# Patient Record
Sex: MALE | URBAN - METROPOLITAN AREA
[De-identification: names, ages, dates, MRNs, and addresses within clinical notes are randomized per-mention and may not be internally consistent; named-entity substitution may affect disease eponyms.]

---

## 2023-07-26 ENCOUNTER — APPOINTMENT (OUTPATIENT)
Dept: URBAN - METROPOLITAN AREA CLINIC 258 | Age: 43
Setting detail: DERMATOLOGY
End: 2023-07-27

## 2023-07-26 VITALS — WEIGHT: 195 LBS | HEIGHT: 73 IN

## 2023-07-26 DIAGNOSIS — D17 BENIGN LIPOMATOUS NEOPLASM: ICD-10-CM

## 2023-07-26 PROBLEM — D17.22 BENIGN LIPOMATOUS NEOPLASM OF SKIN AND SUBCUTANEOUS TISSUE OF LEFT ARM: Status: ACTIVE | Noted: 2023-07-26

## 2023-07-26 PROBLEM — D17.24 BENIGN LIPOMATOUS NEOPLASM OF SKIN AND SUBCUTANEOUS TISSUE OF LEFT LEG: Status: ACTIVE | Noted: 2023-07-26

## 2023-07-26 PROCEDURE — OTHER ADDITIONAL NOTES: OTHER

## 2023-07-26 PROCEDURE — OTHER MIPS QUALITY: OTHER

## 2023-07-26 PROCEDURE — OTHER COUNSELING: OTHER

## 2023-07-26 PROCEDURE — OTHER DEFER: OTHER

## 2023-07-26 PROCEDURE — 99202 OFFICE O/P NEW SF 15 MIN: CPT

## 2023-07-26 ASSESSMENT — LOCATION ZONE DERM
LOCATION ZONE: ARM
LOCATION ZONE: LEG

## 2023-07-26 ASSESSMENT — LOCATION DETAILED DESCRIPTION DERM
LOCATION DETAILED: LEFT LATERAL PROXIMAL UPPER ARM
LOCATION DETAILED: LEFT POSTERIOR LATERAL DISTAL THIGH
LOCATION DETAILED: LEFT DISTAL LATERAL DORSAL FOREARM

## 2023-07-26 ASSESSMENT — LOCATION SIMPLE DESCRIPTION DERM
LOCATION SIMPLE: LEFT FOREARM
LOCATION SIMPLE: LEFT UPPER ARM
LOCATION SIMPLE: LEFT THIGH

## 2023-07-26 NOTE — PROCEDURE: ADDITIONAL NOTES
Detail Level: Simple
Additional Notes: Patient educated about surgical removal procedure. Patient would like to think about it. Patient given CPT code to estimate expected cost.
Render Risk Assessment In Note?: no

## 2023-07-26 NOTE — PROCEDURE: DEFER
Introduction Text (Please End With A Colon): The following procedure was deferred:
Detail Level: Detailed
X Size Of Lesion In Cm (Optional): 0
Procedure To Be Performed At Next Visit: Excision by punch method

## 2023-07-26 NOTE — HPI: CYST
Is This A New Presentation, Or A Follow-Up?: Cysts
Additional History: Patient has several cysts. 3 cysts on left arm and 1 cyst on left leg. He reports that they are not painful. He would like to know his options for removal.

## 2023-12-18 ENCOUNTER — APPOINTMENT (OUTPATIENT)
Dept: URBAN - METROPOLITAN AREA CLINIC 258 | Age: 43
Setting detail: DERMATOLOGY
End: 2023-12-19

## 2023-12-18 VITALS — WEIGHT: 200 LBS | HEIGHT: 73 IN

## 2023-12-18 DIAGNOSIS — D17 BENIGN LIPOMATOUS NEOPLASM: ICD-10-CM

## 2023-12-18 PROBLEM — D17.22 BENIGN LIPOMATOUS NEOPLASM OF SKIN AND SUBCUTANEOUS TISSUE OF LEFT ARM: Status: ACTIVE | Noted: 2023-12-18

## 2023-12-18 PROCEDURE — OTHER PUNCH EXCISION: OTHER

## 2023-12-18 PROCEDURE — OTHER COUNSELING: OTHER

## 2023-12-18 PROCEDURE — 11400 EXC TR-EXT B9+MARG 0.5 CM<: CPT

## 2023-12-18 PROCEDURE — 11400 EXC TR-EXT B9+MARG 0.5 CM<: CPT | Mod: 76

## 2023-12-18 PROCEDURE — A4550 SURGICAL TRAYS: HCPCS

## 2023-12-18 PROCEDURE — OTHER MIPS QUALITY: OTHER

## 2023-12-18 ASSESSMENT — LOCATION SIMPLE DESCRIPTION DERM
LOCATION SIMPLE: LEFT FOREARM
LOCATION SIMPLE: LEFT UPPER ARM

## 2023-12-18 ASSESSMENT — LOCATION ZONE DERM: LOCATION ZONE: ARM

## 2023-12-18 ASSESSMENT — LOCATION DETAILED DESCRIPTION DERM
LOCATION DETAILED: LEFT PROXIMAL RADIAL DORSAL FOREARM
LOCATION DETAILED: LEFT DISTAL POSTERIOR UPPER ARM

## 2023-12-18 NOTE — PROCEDURE: PUNCH EXCISION
Size Of Lesion In Cm: 0
Excision Method: 6 mm Punch
Repair Type: None (Simple)
Complex Requirements: Extensive Undermining Performed?: No
Undermining Type: Entire Wound
Debridement Text: The wound edges were debrided prior to proceeding with the closure to facilitate wound healing.
Helical Rim Text: The closure involved the helical rim.
Vermilion Border Text: The closure involved the vermilion border.
Nostril Rim Text: The closure involved the nostril rim.
Retention Suture Text: Retention sutures were placed to support the closure and prevent dehiscence.
Suture Removal: 10 days
Lab: [id number]
Detail Level: Detailed
Excision Depth: adipose tissue
Medical Necessity Clause: The excision was medically necessary because the lesion which was excised was
Anesthesia Type: 1% Xylocaine with 1:200,000 epinephrine and sodium bicarbonate
Additional Anesthesia Volume In Cc: 6
Hemostasis: Electrocautery
Estimated Blood Loss (Cc): minimal
Anesthesia Type: 1% lidocaine with epinephrine
Deep Sutures: 5-0 Vicryl
Epidermal Sutures: 4-0 Nylon
Number Of Epidermal Sutures (Optional): 3
Epidermal Closure: simple interrupted
Wound Care: Petrolatum
Dressing: dry sterile dressing
Complex Repair Preamble Text (Leave Blank If You Do Not Want): Extensive wide undermining was performed.
Intermediate Repair Preamble Text (Leave Blank If You Do Not Want): Undermining was performed with blunt dissection.
1.5 Mm Punch Excision Text: A 1.5 mm punch was used to make an initial incision over the lesion.  After this overlying column of skin was removed, blunt dissection was used to free the lesion from the surrounding tissues and the lesion was extirpated through the surgical opening made by the punch biopsy.
2 Mm Punch Excision Text: A 2 mm punch was used to make an initial incision over the lesion.  After this overlying column of skin was removed, blunt dissection was used to free the lesion from the surrounding tissues and the lesion was extirpated through the surgical opening made by the punch biopsy.
2.5 Mm Punch Excision Text: A 2.5 mm punch was used to make an initial incision over the lesion.  After this overlying column of skin was removed, blunt dissection was used to free the lesion from the surrounding tissues and the lesion was extirpated through the surgical opening made by the punch biopsy.
3 Mm Punch Excision Text: A 3 mm punch was used to make an initial incision over the lesion.  After this overlying column of skin was removed, blunt dissection was used to free the lesion from the surrounding tissues and the lesion was extirpated through the surgical opening made by the punch biopsy.
3.5 Mm Punch Excision Text: A 3.5 mm punch was used to make an initial incision over the lesion.  After this overlying column of skin was removed, blunt dissection was used to free the lesion from the surrounding tissues and the lesion was extirpated through the surgical opening made by the punch biopsy.
4 Mm Punch Excision Text: A 4 mm punch was used to make an initial incision over the lesion.  After this overlying column of skin was removed, blunt dissection was used to free the lesion from the surrounding tissues and the lesion was extirpated through the surgical opening made by the punch biopsy.
4.5 Mm Punch Excision Text: A 4.5 mm punch was used to make an initial incision over the lesion.  After this overlying column of skin was removed, blunt dissection was used to free the lesion from the surrounding tissues and the lesion was extirpated through the surgical opening made by the punch biopsy.
5 Mm Punch Excision Text: A 5 mm punch was used to make an initial incision over the lesion.  After this overlying column of skin was removed, blunt dissection was used to free the lesion from the surrounding tissues and the lesion was extirpated through the surgical opening made by the punch biopsy.
6 Mm Punch Excision Text: A 6 mm punch was used to make an initial incision over the lesion.  After this overlying column of skin was removed, blunt dissection was used to free the lesion from the surrounding tissues and the lesion was extirpated through the surgical opening made by the punch biopsy.
7 Mm Punch Excision Text: A 7 mm punch was used to make an initial incision over the lesion.  After this overlying column of skin was removed, blunt dissection was used to free the lesion from the surrounding tissues and the lesion was extirpated through the surgical opening made by the punch biopsy.
8 Mm Punch Excision Text: A 8 mm punch was used to make an initial incision over the lesion.  After this overlying column of skin was removed, blunt dissection was used to free the lesion from the surrounding tissues and the lesion was extirpated through the surgical opening made by the punch biopsy.
10 Mm Punch Excision Text: A 10 mm punch was used to make an initial incision over the lesion.  After this overlying column of skin was removed, blunt dissection was used to free the lesion from the surrounding tissues and the lesion was extirpated through the surgical opening made by the punch biopsy.
12 Mm Punch Excision Text: A 12 mm punch was used to make an initial incision over the lesion.  After this overlying column of skin was removed, blunt dissection was used to free the lesion from the surrounding tissues and the lesion was extirpated through the surgical opening made by the punch biopsy.
Purse String (Intermediate) Text: Given the location of the defect and the characteristics of the surrounding skin a purse string intermediate closure was deemed most appropriate.  Undermining was performed circumferentially around the surgical defect.  A purse string suture was then placed and tightened.
Path Notes (To The Dermatopathologist): Please check margins.
Medical Necessity Clause: This procedure was medically necessary because the lesion that was treated was:
Consent was obtained from the patient. The risks and benefits to therapy were discussed in detail. Specifically, the risks of infection, scarring, bleeding, prolonged wound healing, incomplete removal, allergy to anesthesia, nerve injury and recurrence were addressed. Prior to the procedure, the treatment site was clearly identified and confirmed by the patient. All components of Universal Protocol/PAUSE Rule completed.
Render Post-Care Instructions In Note?: yes
Post-Care Instructions: I reviewed with the patient in detail post-care instructions. Patient is not to engage in any heavy lifting, exercise, or swimming for the next 14 days. Should the patient develop any fevers, chills, bleeding, severe pain patient will contact the office immediately.
Billing Type: Third-Party Bill
Repair Type: None

## 2025-01-09 ENCOUNTER — TELEPHONE (OUTPATIENT)
Dept: ORTHOPEDICS | Facility: CLINIC | Age: 45
End: 2025-01-09

## 2025-01-09 ENCOUNTER — ANCILLARY PROCEDURE (OUTPATIENT)
Dept: GENERAL RADIOLOGY | Facility: CLINIC | Age: 45
End: 2025-01-09
Attending: PHYSICIAN ASSISTANT
Payer: COMMERCIAL

## 2025-01-09 ENCOUNTER — OFFICE VISIT (OUTPATIENT)
Dept: URGENT CARE | Facility: URGENT CARE | Age: 45
End: 2025-01-09
Payer: COMMERCIAL

## 2025-01-09 VITALS
SYSTOLIC BLOOD PRESSURE: 165 MMHG | HEIGHT: 73 IN | TEMPERATURE: 99.2 F | OXYGEN SATURATION: 99 % | DIASTOLIC BLOOD PRESSURE: 77 MMHG | HEART RATE: 81 BPM | RESPIRATION RATE: 18 BRPM | WEIGHT: 195 LBS | BODY MASS INDEX: 25.84 KG/M2

## 2025-01-09 DIAGNOSIS — S99.922A FOOT INJURY, LEFT, INITIAL ENCOUNTER: ICD-10-CM

## 2025-01-09 DIAGNOSIS — S99.912A INJURY OF LEFT ANKLE, INITIAL ENCOUNTER: ICD-10-CM

## 2025-01-09 DIAGNOSIS — S93.325A DISLOCATION OF TARSOMETATARSAL JOINT OF LEFT FOOT, INITIAL ENCOUNTER: ICD-10-CM

## 2025-01-09 DIAGNOSIS — S92.302A CLOSED DISPLACED FRACTURE OF METATARSAL BONE OF LEFT FOOT, UNSPECIFIED METATARSAL, INITIAL ENCOUNTER: Primary | ICD-10-CM

## 2025-01-09 RX ORDER — NAPROXEN 500 MG/1
500 TABLET ORAL 2 TIMES DAILY WITH MEALS
Qty: 14 TABLET | Refills: 0 | Status: SHIPPED | OUTPATIENT
Start: 2025-01-09 | End: 2025-01-16

## 2025-01-09 ASSESSMENT — ENCOUNTER SYMPTOMS
ARTHRALGIAS: 1
JOINT SWELLING: 1

## 2025-01-09 ASSESSMENT — PAIN SCALES - GENERAL: PAINLEVEL_OUTOF10: EXTREME PAIN (8)
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
## 2025-01-09 NOTE — TELEPHONE ENCOUNTER
ATC called patient to schedule with Dr. Saenz for 10am.  Explained our clinic location and advised on parking/navigating the clinic.  Explained appt will initially be made with Polly Power, and then changed to Dr. Saenz once we get his template open for that.  Patient understands and will be here at 10am on 1/10/25.    Livan Saini MS, ATC, LAT, [facility]  Dr. Humberto Saenz

## 2025-01-09 NOTE — PATIENT INSTRUCTIONS
I placed an Emergent referral to Podiatry so you can be seen in one day.  Wear the walking boot at all times  Be non weighting bearing at all times until you see Podiatrist  Use the crutches for non weight bearing  You can start using an ice bath for a few seconds each time to help with the swelling  Take Tylenol and Naproxen for pain.
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
## 2025-01-09 NOTE — TELEPHONE ENCOUNTER
Please see pt's FOOT FRACTURE, SURGICAL EMERGENCY REFERRAL (ON FILE).    Are you able to fit this pt in?    Pt would like to be seen in the [place] area.    Imaging ON FILE.    Please review and CALL pt to discuss/schedule. Thank you.

## 2025-01-10 ENCOUNTER — ANCILLARY PROCEDURE (OUTPATIENT)
Dept: CT IMAGING | Facility: CLINIC | Age: 45
End: 2025-01-10
Attending: PHYSICIAN ASSISTANT
Payer: COMMERCIAL

## 2025-01-10 ENCOUNTER — ANCILLARY PROCEDURE (OUTPATIENT)
Dept: GENERAL RADIOLOGY | Facility: CLINIC | Age: 45
End: 2025-01-10
Attending: PHYSICIAN ASSISTANT
Payer: COMMERCIAL

## 2025-01-10 DIAGNOSIS — S93.325A LISFRANC DISLOCATION, LEFT, INITIAL ENCOUNTER: ICD-10-CM

## 2025-01-10 DIAGNOSIS — S92.302A CLOSED DISPLACED FRACTURE OF METATARSAL BONE OF LEFT FOOT, UNSPECIFIED METATARSAL, INITIAL ENCOUNTER: ICD-10-CM

## 2025-01-10 DIAGNOSIS — Z98.890 S/P FOOT SURGERY, RIGHT: ICD-10-CM

## 2025-01-10 DIAGNOSIS — S93.325A DISLOCATION OF TARSOMETATARSAL JOINT OF LEFT FOOT, INITIAL ENCOUNTER: ICD-10-CM

## 2025-01-10 PROCEDURE — 73630 X-RAY EXAM OF FOOT: CPT | Mod: RT | Performed by: RADIOLOGY

## 2025-01-10 PROCEDURE — 73700 CT LOWER EXTREMITY W/O DYE: CPT | Mod: LT | Performed by: RADIOLOGY

## 2025-01-13 ENCOUNTER — VIRTUAL VISIT (OUTPATIENT)
Dept: SURGERY | Facility: CLINIC | Age: 45
End: 2025-01-13
Payer: COMMERCIAL

## 2025-01-13 ENCOUNTER — ANESTHESIA EVENT (OUTPATIENT)
Dept: SURGERY | Facility: CLINIC | Age: 45
End: 2025-01-13
Payer: COMMERCIAL

## 2025-01-13 VITALS — WEIGHT: 195 LBS | BODY MASS INDEX: 25.84 KG/M2 | HEIGHT: 73 IN

## 2025-01-13 DIAGNOSIS — S92.302A CLOSED DISPLACED FRACTURE OF METATARSAL BONE OF LEFT FOOT, UNSPECIFIED METATARSAL, INITIAL ENCOUNTER: ICD-10-CM

## 2025-01-13 DIAGNOSIS — Z01.818 PRE-OP EVALUATION: Primary | ICD-10-CM

## 2025-01-13 DIAGNOSIS — S93.325A: ICD-10-CM

## 2025-01-13 DIAGNOSIS — S93.325A DISLOCATION OF TARSOMETATARSAL JOINT OF LEFT FOOT, INITIAL ENCOUNTER: ICD-10-CM

## 2025-01-13 DIAGNOSIS — Z98.890 S/P FOOT SURGERY, RIGHT: Primary | ICD-10-CM

## 2025-01-13 PROCEDURE — 98000 SYNCH AUDIO-VIDEO NEW SF 15: CPT | Performed by: NURSE PRACTITIONER

## 2025-01-13 RX ORDER — CEFAZOLIN SODIUM 2 G/50ML
2 SOLUTION INTRAVENOUS SEE ADMIN INSTRUCTIONS
Status: CANCELLED | OUTPATIENT
Start: 2025-01-13

## 2025-01-13 RX ORDER — CEFAZOLIN SODIUM 2 G/50ML
2 SOLUTION INTRAVENOUS
Status: CANCELLED | OUTPATIENT
Start: 2025-01-13

## 2025-01-13 ASSESSMENT — ENCOUNTER SYMPTOMS: SEIZURES: 0

## 2025-01-13 ASSESSMENT — LIFESTYLE VARIABLES: TOBACCO_USE: 0

## 2025-01-13 ASSESSMENT — PAIN SCALES - GENERAL: PAINLEVEL_OUTOF10: MILD PAIN (3)

## 2025-01-13 NOTE — PROGRESS NOTES
James is a 44 year old who is being evaluated via a billable video visit.    How would you like to obtain your AVS? Email:[email or web address]   If the video visit is dropped, the invitation should be resent by: Send to e-mail at: [email or web address]      Objective    Vitals - Patient Reported  Pain Score: Mild Pain (3)  Pain Loc: Foot

## 2025-01-13 NOTE — PATIENT INSTRUCTIONS
Preparing for Your Surgery      Name:  James Retana   MRN:  [id number]   :  1980   Today's Date:  2025       Arriving for surgery:  Surgery date:  1/15/2025  Arrival time:  9:30 AM    Please come to:         [facility] [address]   [address] [place]  [address]     The Green Ramp for patients and visitors is beneath the [facility]. The parking facility entrance is at the intersection of [address] [place] [address] and [address]. Patients and visitors who self-park will receive the reduced hospital parking rate (no ticket validation needed).     [employer] parking, located at the [facility] main entrance on [address] [place] [address], is available Monday - Friday from 7 am to 3:30 pm.     Discounted parking pass options can be purchased from  attendants during business hours.     -Check in at the security desk in the [facility] ([facility])   Lobby. They will direct you to the correct elevators.   -Proceed to the 3rd floor, Adult Surgery Waiting Lounge. [phone]     If you need directions, a wheelchair or escort please stop at the Information Desk in the lobby.  Inform the information person you are here for surgery; a wheelchair and escort to [address] will be provided.   An escort to the Adult Surgery Waiting Lounge will be provided. .    What can I eat or drink?  -  You may eat and drink normally up to 8 hours prior to arrival time. (Until 1:30 AM)  -  You may have clear liquids until 2 hours prior to arrival time. (Until 7:30 AM)    Examples of clear liquids:  Water  Clear broth  Juices (apple, white grape, white cranberry  and cider) without pulp  Noncarbonated, powder based beverages  (lemonade and Brian-Aid)  Sodas (Sprite, 7-Up, ginger ale and seltzer)  Coffee or tea (without milk or cream)  Gatorade    -  No Alcohol or cannabis products for at least 24 hours  before surgery.     Which medicines can I take?    Hold Aspirin for 7 days before surgery.   Hold Multivitamins for 7 days before surgery.  Hold Supplements for 7 days before surgery.    **Hold Ibuprofen (Advil, Motrin) for 1 day before surgery--unless otherwise directed by surgeon.  **Hold Naproxen (Aleve) for 4 days before surgery.      How do I prepare myself?  - Please take 2 showers (one the night prior to surgery and one the morning of surgery) using Scrubcare or Hibiclens soap.    Use this soap only from the neck to your toes. Avoid genital area      Leave the soap on your skin for one minute--then rinse thoroughly.      You may use your own shampoo and conditioner. No other hair products.   - Please remove all jewelry and body piercings.  - No lotions, deodorants or fragrance.  - No makeup or fingernail polish.   - Bring your ID and insurance card.    -If you use a CPAP machine, please bring the CPAP machine, tubing, and mask to hospital.    -If you have a Deep Brain Stimulator, Spinal Cord Stimulator, or any Neuro Stimulator device---you must bring the remote control to the hospital.      ALL PATIENTS GOING HOME THE SAME DAY OF SURGERY ARE REQUIRED TO HAVE A RESPONSIBLE ADULT TO DRIVE AND BE IN ATTENDANCE WITH THEM FOR 24 HOURS FOLLOWING SURGERY.    Covid testing policy as of 12/06/2022  Your surgeon will notify and schedule you for a COVID test if one is needed before surgery--please direct any questions or COVID symptoms to your surgeon      Questions or Concerns:    - For any questions regarding the day of surgery or your hospital stay, please contact the Pre Admission Nursing Office at [phone].       - If you have health changes between today and your surgery, please call your surgeon.       - For questions after surgery, please call your surgeons office.           Current Visitor Guidelines    You may have 2 visitors in the pre op area.    Visiting hours: 8 a.m. to 8:30 p.m.    Patients confirmed  or suspected to have symptoms of COVID 19 or flu:     No visitors allowed for adult patients.   Children (under age 18) can have 1 named visitor.     People who are sick or showing symptoms of COVID 19 or flu:    Are not allowed to visit patients--we can only make exceptions in special situations.       Please follow these guidelines for your visit:          Please maintain social distance          Masking is optional--however at times you may be asked to wear a mask for the safety of yourself and others     Clean your hands with alcohol hand . Do this when you arrive at and leave the building and patient room,    And again after you touch your mask or anything in the room.     Go directly to and from the room you are visiting.     Stay in the patient s room during your visit. Limit going to other places in the hospital as much as possible     Leave bags and jackets at home or in the car.     For everyone s health, please don t come and go during your visit. That includes for smoking   during your visit.
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
## 2025-01-15 ENCOUNTER — ANESTHESIA (OUTPATIENT)
Dept: SURGERY | Facility: CLINIC | Age: 45
End: 2025-01-15
Payer: COMMERCIAL

## 2025-01-15 ENCOUNTER — HOSPITAL ENCOUNTER (OUTPATIENT)
Facility: CLINIC | Age: 45
Discharge: HOME | End: 2025-01-15
Attending: ORTHOPAEDIC SURGERY | Admitting: ORTHOPAEDIC SURGERY
Payer: COMMERCIAL

## 2025-01-15 ENCOUNTER — APPOINTMENT (OUTPATIENT)
Dept: GENERAL RADIOLOGY | Facility: CLINIC | Age: 45
End: 2025-01-15
Attending: ORTHOPAEDIC SURGERY
Payer: COMMERCIAL

## 2025-01-15 VITALS
OXYGEN SATURATION: 97 % | HEIGHT: 73 IN | WEIGHT: 190.7 LBS | HEART RATE: 95 BPM | DIASTOLIC BLOOD PRESSURE: 89 MMHG | RESPIRATION RATE: 19 BRPM | TEMPERATURE: 96.7 F | BODY MASS INDEX: 25.27 KG/M2 | SYSTOLIC BLOOD PRESSURE: 121 MMHG

## 2025-01-15 DIAGNOSIS — S93.325A LISFRANC DISLOCATION, LEFT, INITIAL ENCOUNTER: Primary | ICD-10-CM

## 2025-01-15 DIAGNOSIS — S92.302A CLOSED DISPLACED FRACTURE OF METATARSAL BONE OF LEFT FOOT, UNSPECIFIED METATARSAL, INITIAL ENCOUNTER: ICD-10-CM

## 2025-01-15 DIAGNOSIS — Z98.890 S/P FOOT SURGERY, RIGHT: ICD-10-CM

## 2025-01-15 DIAGNOSIS — S93.325A DISLOCATION OF TARSOMETATARSAL JOINT OF LEFT FOOT, INITIAL ENCOUNTER: ICD-10-CM

## 2025-01-15 LAB
CREAT SERPL-MCNC: 1.3 MG/DL (ref 0.67–1.17)
EGFRCR SERPLBLD CKD-EPI 2021: 69 ML/MIN/1.73M2
GLUCOSE BLDC GLUCOMTR-MCNC: 107 MG/DL (ref 70–99)

## 2025-01-15 PROCEDURE — 250N000011 HC RX IP 250 OP 636: Performed by: ANESTHESIOLOGY

## 2025-01-15 PROCEDURE — 250N000011 HC RX IP 250 OP 636: Performed by: NURSE ANESTHETIST, CERTIFIED REGISTERED

## 2025-01-15 PROCEDURE — 271N000001 HC OR GENERAL SUPPLY NON-STERILE: Performed by: ORTHOPAEDIC SURGERY

## 2025-01-15 PROCEDURE — 272N000001 HC OR GENERAL SUPPLY STERILE: Performed by: ORTHOPAEDIC SURGERY

## 2025-01-15 PROCEDURE — 370N000017 HC ANESTHESIA TECHNICAL FEE, PER MIN: Performed by: ORTHOPAEDIC SURGERY

## 2025-01-15 PROCEDURE — C1713 ANCHOR/SCREW BN/BN,TIS/BN: HCPCS | Performed by: ORTHOPAEDIC SURGERY

## 2025-01-15 PROCEDURE — 82565 ASSAY OF CREATININE: CPT | Performed by: NURSE PRACTITIONER

## 2025-01-15 PROCEDURE — 250N000009 HC RX 250: Mod: JZ | Performed by: ANESTHESIOLOGY

## 2025-01-15 PROCEDURE — 360N000084 HC SURGERY LEVEL 4 W/ FLUORO, PER MIN: Performed by: ORTHOPAEDIC SURGERY

## 2025-01-15 PROCEDURE — 999N000180 XR SURGERY CARM FLUORO LESS THAN 5 MIN

## 2025-01-15 PROCEDURE — 250N000009 HC RX 250: Performed by: ORTHOPAEDIC SURGERY

## 2025-01-15 PROCEDURE — 250N000009 HC RX 250: Performed by: NURSE ANESTHETIST, CERTIFIED REGISTERED

## 2025-01-15 PROCEDURE — 710N000012 HC RECOVERY PHASE 2, PER MINUTE: Performed by: ORTHOPAEDIC SURGERY

## 2025-01-15 PROCEDURE — 82962 GLUCOSE BLOOD TEST: CPT

## 2025-01-15 PROCEDURE — 258N000003 HC RX IP 258 OP 636: Performed by: NURSE ANESTHETIST, CERTIFIED REGISTERED

## 2025-01-15 PROCEDURE — 250N000011 HC RX IP 250 OP 636: Performed by: ORTHOPAEDIC SURGERY

## 2025-01-15 PROCEDURE — 250N000011 HC RX IP 250 OP 636: Mod: JZ | Performed by: ANESTHESIOLOGY

## 2025-01-15 PROCEDURE — 710N000010 HC RECOVERY PHASE 1, LEVEL 2, PER MIN: Performed by: ORTHOPAEDIC SURGERY

## 2025-01-15 PROCEDURE — 999N000141 HC STATISTIC PRE-PROCEDURE NURSING ASSESSMENT: Performed by: ORTHOPAEDIC SURGERY

## 2025-01-15 PROCEDURE — 36415 COLL VENOUS BLD VENIPUNCTURE: CPT | Performed by: NURSE PRACTITIONER

## 2025-01-15 DEVICE — IMPLANTABLE DEVICE: Type: IMPLANTABLE DEVICE | Site: FOOT | Status: FUNCTIONAL

## 2025-01-15 DEVICE — IMP WIRE KIRSCHNER STRK 1.25X150MM 390157: Type: IMPLANTABLE DEVICE | Site: FOOT | Status: FUNCTIONAL

## 2025-01-15 RX ORDER — OXYCODONE HYDROCHLORIDE 5 MG/1
5-10 TABLET ORAL EVERY 4 HOURS PRN
Qty: 20 TABLET | Refills: 0 | Status: SHIPPED | OUTPATIENT
Start: 2025-01-15 | End: 2025-01-15

## 2025-01-15 RX ORDER — DEXMEDETOMIDINE HYDROCHLORIDE 4 UG/ML
INJECTION, SOLUTION INTRAVENOUS
Status: COMPLETED | OUTPATIENT
Start: 2025-01-15 | End: 2025-01-15

## 2025-01-15 RX ORDER — CEFAZOLIN SODIUM/WATER 2 G/20 ML
2 SYRINGE (ML) INTRAVENOUS
Status: COMPLETED | OUTPATIENT
Start: 2025-01-15 | End: 2025-01-15

## 2025-01-15 RX ORDER — METHOCARBAMOL 750 MG/1
750 TABLET, FILM COATED ORAL EVERY 6 HOURS PRN
Status: CANCELLED | OUTPATIENT
Start: 2025-01-15

## 2025-01-15 RX ORDER — NALOXONE HYDROCHLORIDE 0.4 MG/ML
0.2 INJECTION, SOLUTION INTRAMUSCULAR; INTRAVENOUS; SUBCUTANEOUS
Status: DISCONTINUED | OUTPATIENT
Start: 2025-01-15 | End: 2025-01-15 | Stop reason: HOSPADM

## 2025-01-15 RX ORDER — PROPOFOL 10 MG/ML
INJECTION, EMULSION INTRAVENOUS CONTINUOUS PRN
Status: DISCONTINUED | OUTPATIENT
Start: 2025-01-15 | End: 2025-01-15

## 2025-01-15 RX ORDER — ONDANSETRON 2 MG/ML
4 INJECTION INTRAMUSCULAR; INTRAVENOUS EVERY 30 MIN PRN
Status: DISCONTINUED | OUTPATIENT
Start: 2025-01-15 | End: 2025-01-15 | Stop reason: HOSPADM

## 2025-01-15 RX ORDER — FENTANYL CITRATE 50 UG/ML
INJECTION, SOLUTION INTRAMUSCULAR; INTRAVENOUS PRN
Status: DISCONTINUED | OUTPATIENT
Start: 2025-01-15 | End: 2025-01-15

## 2025-01-15 RX ORDER — DEXAMETHASONE SODIUM PHOSPHATE 10 MG/ML
INJECTION, SOLUTION INTRAMUSCULAR; INTRAVENOUS
Status: COMPLETED | OUTPATIENT
Start: 2025-01-15 | End: 2025-01-15

## 2025-01-15 RX ORDER — HYDROMORPHONE HYDROCHLORIDE 1 MG/ML
0.4 INJECTION, SOLUTION INTRAMUSCULAR; INTRAVENOUS; SUBCUTANEOUS EVERY 5 MIN PRN
Status: DISCONTINUED | OUTPATIENT
Start: 2025-01-15 | End: 2025-01-15 | Stop reason: HOSPADM

## 2025-01-15 RX ORDER — ASPIRIN 81 MG/1
81 TABLET ORAL 2 TIMES DAILY
Status: CANCELLED | OUTPATIENT
Start: 2025-01-15

## 2025-01-15 RX ORDER — PROCHLORPERAZINE MALEATE 10 MG
10 TABLET ORAL EVERY 6 HOURS PRN
Status: CANCELLED | OUTPATIENT
Start: 2025-01-15

## 2025-01-15 RX ORDER — ONDANSETRON 2 MG/ML
4 INJECTION INTRAMUSCULAR; INTRAVENOUS EVERY 6 HOURS PRN
Status: CANCELLED | OUTPATIENT
Start: 2025-01-15

## 2025-01-15 RX ORDER — DEXAMETHASONE SODIUM PHOSPHATE 4 MG/ML
INJECTION, SOLUTION INTRA-ARTICULAR; INTRALESIONAL; INTRAMUSCULAR; INTRAVENOUS; SOFT TISSUE PRN
Status: DISCONTINUED | OUTPATIENT
Start: 2025-01-15 | End: 2025-01-15

## 2025-01-15 RX ORDER — NALOXONE HYDROCHLORIDE 0.4 MG/ML
0.4 INJECTION, SOLUTION INTRAMUSCULAR; INTRAVENOUS; SUBCUTANEOUS
Status: DISCONTINUED | OUTPATIENT
Start: 2025-01-15 | End: 2025-01-15 | Stop reason: HOSPADM

## 2025-01-15 RX ORDER — ACETAMINOPHEN 325 MG/1
650 TABLET ORAL EVERY 6 HOURS PRN
COMMUNITY

## 2025-01-15 RX ORDER — ASPIRIN 81 MG/1
81 TABLET ORAL 2 TIMES DAILY
Qty: 60 TABLET | Refills: 0 | Status: SHIPPED | OUTPATIENT
Start: 2025-01-15

## 2025-01-15 RX ORDER — KETOROLAC TROMETHAMINE 30 MG/ML
INJECTION, SOLUTION INTRAMUSCULAR; INTRAVENOUS PRN
Status: DISCONTINUED | OUTPATIENT
Start: 2025-01-15 | End: 2025-01-15

## 2025-01-15 RX ORDER — BUPIVACAINE HYDROCHLORIDE 2.5 MG/ML
INJECTION, SOLUTION EPIDURAL; INFILTRATION; INTRACAUDAL
Status: COMPLETED | OUTPATIENT
Start: 2025-01-15 | End: 2025-01-15

## 2025-01-15 RX ORDER — HYDROMORPHONE HYDROCHLORIDE 1 MG/ML
0.2 INJECTION, SOLUTION INTRAMUSCULAR; INTRAVENOUS; SUBCUTANEOUS EVERY 5 MIN PRN
Status: DISCONTINUED | OUTPATIENT
Start: 2025-01-15 | End: 2025-01-15 | Stop reason: HOSPADM

## 2025-01-15 RX ORDER — ASPIRIN 81 MG/1
81 TABLET ORAL 2 TIMES DAILY
Qty: 60 TABLET | Refills: 0 | Status: SHIPPED | OUTPATIENT
Start: 2025-01-15 | End: 2025-01-15

## 2025-01-15 RX ORDER — KETOROLAC TROMETHAMINE 15 MG/ML
15 INJECTION, SOLUTION INTRAMUSCULAR; INTRAVENOUS EVERY 6 HOURS
Status: CANCELLED | OUTPATIENT
Start: 2025-01-15 | End: 2025-01-16

## 2025-01-15 RX ORDER — KETOROLAC TROMETHAMINE 30 MG/ML
15 INJECTION, SOLUTION INTRAMUSCULAR; INTRAVENOUS
Status: DISCONTINUED | OUTPATIENT
Start: 2025-01-15 | End: 2025-01-15 | Stop reason: HOSPADM

## 2025-01-15 RX ORDER — PROPOFOL 10 MG/ML
INJECTION, EMULSION INTRAVENOUS PRN
Status: DISCONTINUED | OUTPATIENT
Start: 2025-01-15 | End: 2025-01-15

## 2025-01-15 RX ORDER — SODIUM CHLORIDE, SODIUM LACTATE, POTASSIUM CHLORIDE, CALCIUM CHLORIDE 600; 310; 30; 20 MG/100ML; MG/100ML; MG/100ML; MG/100ML
INJECTION, SOLUTION INTRAVENOUS CONTINUOUS
Status: DISCONTINUED | OUTPATIENT
Start: 2025-01-15 | End: 2025-01-15 | Stop reason: HOSPADM

## 2025-01-15 RX ORDER — CEFAZOLIN SODIUM/WATER 2 G/20 ML
2 SYRINGE (ML) INTRAVENOUS SEE ADMIN INSTRUCTIONS
Status: DISCONTINUED | OUTPATIENT
Start: 2025-01-15 | End: 2025-01-15 | Stop reason: HOSPADM

## 2025-01-15 RX ORDER — NALOXONE HYDROCHLORIDE 0.4 MG/ML
0.1 INJECTION, SOLUTION INTRAMUSCULAR; INTRAVENOUS; SUBCUTANEOUS
Status: DISCONTINUED | OUTPATIENT
Start: 2025-01-15 | End: 2025-01-15 | Stop reason: HOSPADM

## 2025-01-15 RX ORDER — OXYCODONE HYDROCHLORIDE 5 MG/1
5-10 TABLET ORAL EVERY 4 HOURS PRN
Qty: 20 TABLET | Refills: 0 | OUTPATIENT
Start: 2025-01-15

## 2025-01-15 RX ORDER — FENTANYL CITRATE 50 UG/ML
25 INJECTION, SOLUTION INTRAMUSCULAR; INTRAVENOUS EVERY 5 MIN PRN
Status: DISCONTINUED | OUTPATIENT
Start: 2025-01-15 | End: 2025-01-15 | Stop reason: HOSPADM

## 2025-01-15 RX ORDER — MAGNESIUM HYDROXIDE 1200 MG/15ML
LIQUID ORAL PRN
Status: DISCONTINUED | OUTPATIENT
Start: 2025-01-15 | End: 2025-01-15 | Stop reason: HOSPADM

## 2025-01-15 RX ORDER — LIDOCAINE HYDROCHLORIDE 20 MG/ML
INJECTION, SOLUTION INFILTRATION; PERINEURAL PRN
Status: DISCONTINUED | OUTPATIENT
Start: 2025-01-15 | End: 2025-01-15

## 2025-01-15 RX ORDER — ONDANSETRON 2 MG/ML
INJECTION INTRAMUSCULAR; INTRAVENOUS PRN
Status: DISCONTINUED | OUTPATIENT
Start: 2025-01-15 | End: 2025-01-15

## 2025-01-15 RX ORDER — ONDANSETRON 4 MG/1
4 TABLET, ORALLY DISINTEGRATING ORAL EVERY 6 HOURS PRN
Status: CANCELLED | OUTPATIENT
Start: 2025-01-15

## 2025-01-15 RX ORDER — FENTANYL CITRATE 50 UG/ML
50 INJECTION, SOLUTION INTRAMUSCULAR; INTRAVENOUS EVERY 5 MIN PRN
Status: DISCONTINUED | OUTPATIENT
Start: 2025-01-15 | End: 2025-01-15 | Stop reason: HOSPADM

## 2025-01-15 RX ORDER — OXYCODONE HYDROCHLORIDE 10 MG/1
10 TABLET ORAL
Status: DISCONTINUED | OUTPATIENT
Start: 2025-01-15 | End: 2025-01-15 | Stop reason: HOSPADM

## 2025-01-15 RX ORDER — FENTANYL CITRATE 50 UG/ML
25-50 INJECTION, SOLUTION INTRAMUSCULAR; INTRAVENOUS
Status: DISCONTINUED | OUTPATIENT
Start: 2025-01-15 | End: 2025-01-15 | Stop reason: HOSPADM

## 2025-01-15 RX ORDER — FLUMAZENIL 0.1 MG/ML
0.2 INJECTION, SOLUTION INTRAVENOUS
Status: DISCONTINUED | OUTPATIENT
Start: 2025-01-15 | End: 2025-01-15 | Stop reason: HOSPADM

## 2025-01-15 RX ORDER — EPHEDRINE SULFATE 50 MG/ML
INJECTION, SOLUTION INTRAMUSCULAR; INTRAVENOUS; SUBCUTANEOUS PRN
Status: DISCONTINUED | OUTPATIENT
Start: 2025-01-15 | End: 2025-01-15

## 2025-01-15 RX ORDER — DEXAMETHASONE SODIUM PHOSPHATE 4 MG/ML
4 INJECTION, SOLUTION INTRA-ARTICULAR; INTRALESIONAL; INTRAMUSCULAR; INTRAVENOUS; SOFT TISSUE
Status: DISCONTINUED | OUTPATIENT
Start: 2025-01-15 | End: 2025-01-15 | Stop reason: HOSPADM

## 2025-01-15 RX ORDER — OXYCODONE HYDROCHLORIDE 5 MG/1
5 TABLET ORAL
Status: DISCONTINUED | OUTPATIENT
Start: 2025-01-15 | End: 2025-01-15 | Stop reason: HOSPADM

## 2025-01-15 RX ORDER — POLYETHYLENE GLYCOL 3350 17 G/17G
17 POWDER, FOR SOLUTION ORAL DAILY
Status: CANCELLED | OUTPATIENT
Start: 2025-01-16

## 2025-01-15 RX ORDER — CEFAZOLIN SODIUM 2 G/100ML
2 INJECTION, SOLUTION INTRAVENOUS EVERY 8 HOURS
Status: CANCELLED | OUTPATIENT
Start: 2025-01-15 | End: 2025-01-16

## 2025-01-15 RX ORDER — ACETAMINOPHEN 325 MG/1
975 TABLET ORAL EVERY 8 HOURS
Status: CANCELLED | OUTPATIENT
Start: 2025-01-15

## 2025-01-15 RX ORDER — ONDANSETRON 4 MG/1
4 TABLET, ORALLY DISINTEGRATING ORAL EVERY 30 MIN PRN
Status: DISCONTINUED | OUTPATIENT
Start: 2025-01-15 | End: 2025-01-15 | Stop reason: HOSPADM

## 2025-01-15 RX ORDER — OXYCODONE HYDROCHLORIDE 5 MG/1
5 TABLET ORAL EVERY 4 HOURS PRN
Status: CANCELLED | OUTPATIENT
Start: 2025-01-15

## 2025-01-15 RX ORDER — OXYCODONE HYDROCHLORIDE 5 MG/1
5-10 TABLET ORAL EVERY 4 HOURS PRN
Qty: 20 TABLET | Refills: 0 | Status: SHIPPED | OUTPATIENT
Start: 2025-01-15

## 2025-01-15 RX ORDER — SODIUM CHLORIDE, SODIUM LACTATE, POTASSIUM CHLORIDE, CALCIUM CHLORIDE 600; 310; 30; 20 MG/100ML; MG/100ML; MG/100ML; MG/100ML
INJECTION, SOLUTION INTRAVENOUS CONTINUOUS PRN
Status: DISCONTINUED | OUTPATIENT
Start: 2025-01-15 | End: 2025-01-15

## 2025-01-15 RX ORDER — SODIUM CHLORIDE, SODIUM LACTATE, POTASSIUM CHLORIDE, CALCIUM CHLORIDE 600; 310; 30; 20 MG/100ML; MG/100ML; MG/100ML; MG/100ML
INJECTION, SOLUTION INTRAVENOUS CONTINUOUS
Status: CANCELLED | OUTPATIENT
Start: 2025-01-15

## 2025-01-15 RX ORDER — HYDROMORPHONE HYDROCHLORIDE 1 MG/ML
0.2 INJECTION, SOLUTION INTRAMUSCULAR; INTRAVENOUS; SUBCUTANEOUS
Status: CANCELLED | OUTPATIENT
Start: 2025-01-15

## 2025-01-15 RX ORDER — LIDOCAINE 40 MG/G
CREAM TOPICAL
Status: CANCELLED | OUTPATIENT
Start: 2025-01-15

## 2025-01-15 RX ORDER — HYDROMORPHONE HYDROCHLORIDE 4 MG/ML
INJECTION, SOLUTION INTRAMUSCULAR; INTRAVENOUS; SUBCUTANEOUS PRN
Status: DISCONTINUED | OUTPATIENT
Start: 2025-01-15 | End: 2025-01-15

## 2025-01-15 RX ORDER — BISACODYL 10 MG
10 SUPPOSITORY, RECTAL RECTAL DAILY PRN
Status: CANCELLED | OUTPATIENT
Start: 2025-01-18

## 2025-01-15 RX ORDER — AMOXICILLIN 250 MG
1 CAPSULE ORAL 2 TIMES DAILY
Status: CANCELLED | OUTPATIENT
Start: 2025-01-15

## 2025-01-15 RX ORDER — HYDROMORPHONE HYDROCHLORIDE 1 MG/ML
0.4 INJECTION, SOLUTION INTRAMUSCULAR; INTRAVENOUS; SUBCUTANEOUS
Status: CANCELLED | OUTPATIENT
Start: 2025-01-15

## 2025-01-15 RX ORDER — HYDROXYZINE HYDROCHLORIDE 25 MG/1
25 TABLET, FILM COATED ORAL EVERY 6 HOURS PRN
Status: CANCELLED | OUTPATIENT
Start: 2025-01-15

## 2025-01-15 RX ORDER — OXYCODONE HYDROCHLORIDE 10 MG/1
10 TABLET ORAL EVERY 4 HOURS PRN
Status: CANCELLED | OUTPATIENT
Start: 2025-01-15

## 2025-01-15 RX ADMIN — FENTANYL CITRATE 50 MCG: 50 INJECTION INTRAMUSCULAR; INTRAVENOUS at 11:30

## 2025-01-15 RX ADMIN — LIDOCAINE HYDROCHLORIDE 80 MG: 20 INJECTION, SOLUTION INFILTRATION; PERINEURAL at 13:14

## 2025-01-15 RX ADMIN — SODIUM CHLORIDE, POTASSIUM CHLORIDE, SODIUM LACTATE AND CALCIUM CHLORIDE: 600; 310; 30; 20 INJECTION, SOLUTION INTRAVENOUS at 13:06

## 2025-01-15 RX ADMIN — DEXAMETHASONE SODIUM PHOSPHATE 1 MG: 10 INJECTION, SOLUTION INTRAMUSCULAR; INTRAVENOUS at 11:55

## 2025-01-15 RX ADMIN — PROPOFOL 100 MCG/KG/MIN: 10 INJECTION, EMULSION INTRAVENOUS at 13:27

## 2025-01-15 RX ADMIN — HYDROMORPHONE HYDROCHLORIDE 0.5 MG: 1 INJECTION, SOLUTION INTRAMUSCULAR; INTRAVENOUS; SUBCUTANEOUS at 17:26

## 2025-01-15 RX ADMIN — PROPOFOL 200 MCG/KG/MIN: 10 INJECTION, EMULSION INTRAVENOUS at 13:15

## 2025-01-15 RX ADMIN — DEXAMETHASONE SODIUM PHOSPHATE 3 MG: 10 INJECTION, SOLUTION INTRAMUSCULAR; INTRAVENOUS at 11:43

## 2025-01-15 RX ADMIN — EPHEDRINE SULFATE 5 MG: 5 INJECTION INTRAVENOUS at 13:33

## 2025-01-15 RX ADMIN — DEXMEDETOMIDINE HYDROCHLORIDE 30 MCG: 100 INJECTION, SOLUTION INTRAVENOUS at 11:43

## 2025-01-15 RX ADMIN — FENTANYL CITRATE 100 MCG: 50 INJECTION INTRAMUSCULAR; INTRAVENOUS at 14:49

## 2025-01-15 RX ADMIN — DEXMEDETOMIDINE HYDROCHLORIDE 10 MCG: 4 INJECTION, SOLUTION INTRAVENOUS at 11:55

## 2025-01-15 RX ADMIN — EPHEDRINE SULFATE 5 MG: 5 INJECTION INTRAVENOUS at 13:41

## 2025-01-15 RX ADMIN — PROPOFOL 200 MG: 10 INJECTION, EMULSION INTRAVENOUS at 13:14

## 2025-01-15 RX ADMIN — FENTANYL CITRATE 100 MCG: 50 INJECTION INTRAMUSCULAR; INTRAVENOUS at 13:27

## 2025-01-15 RX ADMIN — Medication 2 G: at 13:24

## 2025-01-15 RX ADMIN — EPHEDRINE SULFATE 5 MG: 5 INJECTION INTRAVENOUS at 13:37

## 2025-01-15 RX ADMIN — BUPIVACAINE HYDROCHLORIDE 20 ML: 2.5 INJECTION, SOLUTION EPIDURAL; INFILTRATION; INTRACAUDAL; PERINEURAL at 11:43

## 2025-01-15 RX ADMIN — DEXAMETHASONE SODIUM PHOSPHATE 6 MG: 4 INJECTION, SOLUTION INTRAMUSCULAR; INTRAVENOUS at 13:14

## 2025-01-15 RX ADMIN — EPHEDRINE SULFATE 5 MG: 5 INJECTION INTRAVENOUS at 13:43

## 2025-01-15 RX ADMIN — EPHEDRINE SULFATE 5 MG: 5 INJECTION INTRAVENOUS at 13:30

## 2025-01-15 RX ADMIN — MIDAZOLAM 1 MG: 1 INJECTION INTRAMUSCULAR; INTRAVENOUS at 11:30

## 2025-01-15 RX ADMIN — SODIUM CHLORIDE, POTASSIUM CHLORIDE, SODIUM LACTATE AND CALCIUM CHLORIDE: 600; 310; 30; 20 INJECTION, SOLUTION INTRAVENOUS at 14:33

## 2025-01-15 RX ADMIN — BUPIVACAINE HYDROCHLORIDE 10 ML: 2.5 INJECTION, SOLUTION EPIDURAL; INFILTRATION; INTRACAUDAL at 11:55

## 2025-01-15 RX ADMIN — MIDAZOLAM 1 MG: 1 INJECTION INTRAMUSCULAR; INTRAVENOUS at 13:27

## 2025-01-15 RX ADMIN — MIDAZOLAM 1 MG: 1 INJECTION INTRAMUSCULAR; INTRAVENOUS at 13:06

## 2025-01-15 RX ADMIN — ONDANSETRON 4 MG: 2 INJECTION INTRAMUSCULAR; INTRAVENOUS at 15:35

## 2025-01-15 RX ADMIN — KETOROLAC TROMETHAMINE 30 MG: 30 INJECTION, SOLUTION INTRAMUSCULAR at 17:25

## 2025-01-15 ASSESSMENT — ACTIVITIES OF DAILY LIVING (ADL)
ADLS_ACUITY_SCORE: 32
ADLS_ACUITY_SCORE: 34
ADLS_ACUITY_SCORE: 32
ADLS_ACUITY_SCORE: 34
ADLS_ACUITY_SCORE: 32
ADLS_ACUITY_SCORE: 32

## 2025-01-15 NOTE — ANESTHESIA PROCEDURE NOTES
Sciatic Procedure Note    Pre-Procedure   Staff -        Anesthesiologist:  Robles Newton MD       Performed By: anesthesiologist       Location: pre-op       Procedure Start/Stop Times: 1/15/2025 11:43 PM       Pre-Anesthestic Checklist: patient identified, IV checked, site marked, risks and benefits discussed, informed consent, monitors and equipment checked, pre-op evaluation, at physician/surgeon's request and post-op pain management  Timeout:       Correct Patient: Yes        Correct Procedure: Yes        Correct Site: Yes        Correct Position: Yes        Correct Laterality: Yes        Site Marked: Yes  Procedure Documentation  Procedure: Sciatic         Diagnosis: LEFT FOOT FX       Laterality: left       Patient Position: RLD       Skin prep: Chloraprep       Local skin infiltrated with 3 mL of 1% lidocaine.  (popliteal approach).       Needle Type: short bevel       Needle Gauge: 21.        Needle Length (Inches): 4        Ultrasound guided       1. Ultrasound was used to identify targeted nerve, plexus, vascular marker, or fascial plane and place a needle adjacent to it in real-time.       2. Ultrasound was used to visualize the spread of anesthetic in close proximity to the above referenced structure.       3. A permanent image is entered into the patient's record.       4. The visualized anatomic structures appeared normal.    Assessment/Narrative         The placement was negative for: blood aspirated, painful injection and site bleeding       Paresthesias: No.       Bolus given via needle..        Secured via.        Insertion/Infusion Method: Single Shot       Complications: none    Medication(s) Administered   Bupivacaine 0.25% PF (Infiltration) - Infiltration   20 mL - 1/15/2025 11:43:00 AM  Dexamethasone 10 mg/mL PF (Perineural) - Perineural   3 mg - 1/15/2025 11:43:00 AM  Dexmedetomidine 4 mcg/mL (Perineural) - Perineural   30 mcg - 1/15/2025 11:43:00 AM  Medication  "Administration Time: 1/15/2025 11:43 PM      FOR [facility] ([facility]) ONLY:   Pain Team Contact information: please page the Pain Team Via [employer]. Search \"Pain\". During daytime hours, please page the attending first. At night please page the resident first.      "

## 2025-01-15 NOTE — ANESTHESIA PROCEDURE NOTES
Airway       Patient location during procedure: OR       Procedure Start/Stop Times: 1/15/2025 1:16 PM  Staff -        Anesthesiologist:  Gabriel Allen MD       CRNA: Ash Ramos APRN CRNA       Performed By: CRNAIndications and Patient Condition       Indications for airway management: adryan-procedural       Induction type:intravenous       Mask difficulty assessment: 1 - vent by mask    Final Airway Details       Final airway type: supraglottic airway    Supraglottic Airway Details        Type: LMA       Brand: Air-Q       LMA size: 4    Post intubation assessment        Placement verified by: capnometry, equal breath sounds and chest rise        Number of attempts at approach: 1       Number of other approaches attempted: 0       Ease of procedure: easy       Dentition: Intact and Unchanged    Medication(s) Administered   Medication Administration Time: 1/15/2025 1:16 PM

## 2025-01-15 NOTE — ANESTHESIA PROCEDURE NOTES
Saphenous Procedure Note    Pre-Procedure   Staff -        Anesthesiologist:  Robles Newton MD       Performed By: anesthesiologist       Location: pre-op       Procedure Start/Stop Times: 1/15/2025 11:55 PM       Pre-Anesthestic Checklist: patient identified, IV checked, site marked, risks and benefits discussed, informed consent, monitors and equipment checked, pre-op evaluation, at physician/surgeon's request and post-op pain management  Timeout:       Correct Patient: Yes        Correct Procedure: Yes        Correct Site: Yes        Correct Position: Yes        Correct Laterality: Yes        Site Marked: Yes  Procedure Documentation  Procedure: Saphenous         Diagnosis: LEFT FOOT FRACTURE       Laterality: left       Patient Position: supine       Skin prep: Chloraprep       Local skin infiltrated with 2 mL of 1% lidocaine.        Needle Type: short bevel       Needle Gauge: 21.        Needle Length (Inches): 4        Ultrasound guided       1. Ultrasound was used to identify targeted nerve, plexus, vascular marker, or fascial plane and place a needle adjacent to it in real-time.       2. Ultrasound was used to visualize the spread of anesthetic in close proximity to the above referenced structure.       3. A permanent image is entered into the patient's record.       4. The visualized anatomic structures appeared normal.    Assessment/Narrative         The placement was negative for: blood aspirated, painful injection and site bleeding       Paresthesias: No.       Bolus given via needle..        Secured via.        Insertion/Infusion Method: Single Shot       Complications: none    Medication(s) Administered   Bupivacaine 0.25% PF (Infiltration) - Infiltration   10 mL - 1/15/2025 11:55:00 AM  Dexamethasone 10 mg/mL PF (Perineural) - Perineural   1 mg - 1/15/2025 11:55:00 AM  Dexmedetomidine 4 mcg/mL (Perineural) - Perineural   10 mcg - 1/15/2025 11:55:00 AM  Medication Administration Time:  "1/15/2025 11:55 PM      FOR [facility] ([facility]) ONLY:   Pain Team Contact information: please page the Pain Team Via [employer]. Search \"Pain\". During daytime hours, please page the attending first. At night please page the resident first.      "

## 2025-01-15 NOTE — PROGRESS NOTES
Orthopaedic Surgery Attending    I saw and examined this pleasant 44 year old patient preoperatively today in the preop area at the [facility]. The surgical plan for open reduction internal fixation of the patient's left foot Lisfranc injury, including what the surgery involves, the risks, benefits, and alternatives, the postoperative plan, and realistic expectations for outcome, were all discussed in layman's terms. The particular severity of this injury and the expectation for some amount of residual symptoms whether minor or major was discussed. The possibility of additional surgery in the future, for example for hardware removal, was discussed. No guarantees were expressed or implied as to outcome. The planned and possible locations of the surgical incisions was demonstrated. The correct surgical site was marked with patient participation. Exam shows intact skin on the left foot without visible blisters. The skin on the dorsal left foot does passively wrinkle.  All questions were answered.

## 2025-01-15 NOTE — ANESTHESIA PREPROCEDURE EVALUATION
"Anesthesia Pre-Procedure Evaluation    Patient: James Retana   MRN: [id number] : 1980        Procedure : Procedure(s):  Open Reduction Internal Fixation of Left Foot Lisfranc Injury          No past medical history on file.   History reviewed. No pertinent surgical history.   No Known Allergies   Social History     Tobacco Use    Smoking status: Never    Smokeless tobacco: Never   Substance Use Topics    Alcohol use: Yes     Comment: Moderate      Wt Readings from Last 1 Encounters:   01/15/25 86.5 kg (190 lb 11.2 oz)        Anesthesia Evaluation            ROS/MED HX  ENT/Pulmonary:  - neg pulmonary ROS     Neurologic:  - neg neurologic ROS     Cardiovascular:  - neg cardiovascular ROS     METS/Exercise Tolerance:     Hematologic:  - neg hematologic  ROS     Musculoskeletal:  - neg musculoskeletal ROS     GI/Hepatic:  - neg GI/hepatic ROS     Renal/Genitourinary:  - neg Renal ROS     Endo:  - neg endo ROS     Psychiatric/Substance Use:  - neg psychiatric ROS     Infectious Disease:  - neg infectious disease ROS     Malignancy:  - neg malignancy ROS     Other:  - neg other ROS          Physical Exam    Airway  airway exam normal      Mallampati: I       Respiratory Devices and Support         Dental       (+) Modest Abnormalities - crowns, retainers, 1 or 2 missing teeth      Cardiovascular   cardiovascular exam normal          Pulmonary   pulmonary exam normal                OUTSIDE LABS:  CBC: No results found for: \"WBC\", \"HGB\", \"HCT\", \"PLT\"  BMP:   Lab Results   Component Value Date     (H) 01/15/2025     COAGS: No results found for: \"PTT\", \"INR\", \"FIBR\"  POC: No results found for: \"BGM\", \"HCG\", \"HCGS\"  HEPATIC: No results found for: \"ALBUMIN\", \"PROTTOTAL\", \"ALT\", \"AST\", \"GGT\", \"ALKPHOS\", \"BILITOTAL\", \"BILIDIRECT\", \"GARCIA\"  OTHER: No results found for: \"PH\", \"LACT\", \"A1C\", \"ALEA\", \"PHOS\", \"MAG\", \"LIPASE\", \"AMYLASE\", \"TSH\", \"T4\", \"T3\", \"CRP\", \"SED\"    Anesthesia Plan    ASA Status:  1    NPO Status: " " NPO Appropriate    Anesthesia Type: General.     - Airway: LMA   Induction: Intravenous.   Maintenance: TIVA.        Consents    Anesthesia Plan(s) and associated risks, benefits, and realistic alternatives discussed. Questions answered and patient/representative(s) expressed understanding.     - Discussed: Risks, Benefits and Alternatives for BOTH SEDATION and the PROCEDURE were discussed     - Discussed with:  Patient            Postoperative Care    Pain management: Oral pain medications, Multi-modal analgesia, Peripheral nerve block (Single Shot).   PONV prophylaxis: Ondansetron (or other 5HT-3), Dexamethasone or Solumedrol     Comments:               Gabriel Allen MD, MD    I have reviewed the pertinent notes and labs in the chart from the past 30 days and (re)examined the patient.  Any updates or changes from those notes are reflected in this note.                         # Overweight: Estimated body mass index is 25.16 kg/m  as calculated from the following:    Height as of this encounter: 1.854 m (6' 1\").    Weight as of this encounter: 86.5 kg (190 lb 11.2 oz).             "

## 2025-01-16 ENCOUNTER — TELEPHONE (OUTPATIENT)
Dept: ORTHOPEDICS | Facility: CLINIC | Age: 45
End: 2025-01-16
Payer: COMMERCIAL

## 2025-01-16 NOTE — BRIEF OP NOTE
[facility]    Brief Operative Note    Pre-operative diagnosis: Dislocation of tarsometatarsal joint of left foot, initial encounter [S93.325A]  Closed displaced fracture of metatarsal bone of left foot, unspecified metatarsal, initial encounter [S92.302A]  Lisfranc dislocation, left, initial encounter [S93.325A]  Post-operative diagnosis Same as pre-operative diagnosis    Procedure: Open Reduction Internal Fixation of Left Foot Lisfranc Injury, Left - Foot    Surgeon: Surgeons and Role:     * All Saenz MD - Primary     * Pravin Whitaker MD  Anesthesia: General with Block   Estimated Blood Loss: Less than 50 ml    Drains: None  Specimens: * No specimens in log *  Findings:   See op note .  Complications: None.  Implants:   Implant Name Type Inv. Item Serial No.  Lot No. LRB No. Used Action   IMP WIRE OTTO STRK [id number] - [id number]  IMP WIRE OTTO STRK [id number]  [employer]  Left 4 Used as a Supply   IMP WIRE OTTO [id number] - [id number]  IMP WIRE OTTO [id number]  [employer]  Left 3 Used as a Supply   4.0 X 30 MM SCREW FT Metallic Hardware/Barry   ERON N/A Left 1 Implanted   4.0 X 36 MM SCREW FT Metallic Hardware/Barry   ERON N/A Left 1 Implanted   4.0 X 42 MM SCREW FT Metallic Hardware/Barry   ERON N/A Left 1 Implanted   4.0 X 50 MM SCREW FT Metallic Hardware/Barry   ERON N/A Left 1 Implanted   4.0 X 40 MM SCREW FT Metallic Hardware/Barry   ERON N/A Left 1 Implanted   IMP WIRE OTTO [id number] - [id number]  IMP WIRE OTTO [id number]  [employer] N/A Left 2 Implanted     Plan:  - Same day surgery  - NWB operative extremity, crutches  - Tylenol, ibuprofen, oxycodone for pain control  - Keep splint dry  - Follow up in clinic 2 weeks

## 2025-01-16 NOTE — TELEPHONE ENCOUNTER
ATC sent patient MyChart with relevant XR images, requested patient write back if they need anything additional.    Livan Saini MS, ATC, [facility], [facility]  Dr. Humberto Saenz

## 2025-01-16 NOTE — ANESTHESIA CARE TRANSFER NOTE
Patient: James Retana    Procedure: Procedure(s):  Open Reduction Internal Fixation of Left Foot Lisfranc Injury       Diagnosis: Dislocation of tarsometatarsal joint of left foot, initial encounter [S93.325A]  Closed displaced fracture of metatarsal bone of left foot, unspecified metatarsal, initial encounter [S92.302A]  Lisfranc dislocation, left, initial encounter [S93.325A]  Diagnosis Additional Information: No value filed.    Anesthesia Type:   General     Note:    Oropharynx: oropharynx clear of all foreign objects  Level of Consciousness: awake  Oxygen Supplementation: face mask  Level of Supplemental Oxygen (L/min / FiO2): 8  Independent Airway: airway patency satisfactory and stable  Dentition: dentition unchanged  Vital Signs Stable: post-procedure vital signs reviewed and stable  Report to RN Given: handoff report given  Patient transferred to: PACU  Comments: Regular respirations and patent airway. VSS. IV patent and infusing. Pt resting comfortably. Report given to RN    Handoff Report: Identifed the Patient, Identified the Reponsible Provider, Reviewed the pertinent medical history, Discussed the surgical course, Reviewed Intra-OP anesthesia mangement and issues during anesthesia, Set expectations for post-procedure period and Allowed opportunity for questions and acknowledgement of understanding      Vitals:  Vitals Value Taken Time   /83    Temp 36.4    Pulse 83    Resp 14    SpO2 100        Electronically Signed By: ESTEBAN Nuñez CRNA  January 15, 2025  6:19 PM

## 2025-01-16 NOTE — OP NOTE
DATE OF SURGERY:  01/15/2025.     PREOPERATIVE DIAGNOSIS:  Left midfoot fracture-dislocation involving all five tarsometatarsal joints and Lisfranc joint.     POSTOPERATIVE DIAGNOSIS:  Left midfoot fracture-dislocation involving all five tarsometatarsal joints and Lisfranc joint.     PROCEDURE:  Open reduction, internal fixation of left midfoot (all five tarsometatarsal joints and Lisfranc joint).     PRIMARY SURGEON:  All Saenz MD.     ASSISTANT SURGEON:  Pravin Whitaker MD.     ANESTHESIA:  General laryngeal mask airway with left sciatic and saphenous blocks.     COMPLICATIONS:  One minor complication of a broken tip of a guidepin retained in and apparently completely embedded with the lateral cuneiform, about one inch long; discussed with patient postoperatively.  No other apparent complications noted intraoperatively or immediately postoperatively.     IMPLANTS:  One titanium [place] 4.0 mm cannulated screw each to the medial cuneiform and second metatarsal (Lisfranc joint), to the intercuneiform joints, to the first metatarsal and medial cuneiform (first TMT joint), to the second metatarsal and intermediate cuneiform (second TMT joint), and to the third metatarsal and lateral cuneiform (third TMT joint); one 0.062 inch K-wire each to to fourth metatarsal and cuboid (fourth TMT joint), and to fifth metatarsal and cuboid (fifth TMT joint).  All screws were implanted with the use of a 2.5 mm hex head screwdriver.    SIGNIFICANT FINDINGS:  Preoperative injury of homolateral midfoot fracture-dislocation confirmed with lateral translation of all five metatarsal bases relative to their respective cuneiforms / cuboid.  Excellent reduction confirmed intraoperatively with direct visualization and palpation as well as C-arm imaging.  Stable fixation of first three TMT joints and Lisfranc joint with screws, and of fourth and fifth TMT joints with K-wires.  Articular surfaces had visible chondral injury including the  distal surface of the cuboid, distal surface of the lateral cuneiform, and proximal surface of the second metatarsal base.  Dorsal neurovascular bundle visualized and protected the entire procedure, and remained intact throughout the case.    SPECIMENS:  None.     DRAINS:  None.     ESTIMATED BLOOD LOSS:  Approximately 50 mL.    TOURNIQUET TIME:  120 minutes at 250 mmHg.        INDICATIONS:  James Retana is a very pleasant 44-year-old male patient who recently sustained a very severe left foot injury, a closed homolateral Lisfranc injury/midfoot fracture-dislocation with all 5 metatarsals displaced laterally relative to the respective cuneiform/cuboid bones and multiple associated fractures including the second metatarsal base, the distal cuboid articular surface, and other bones.  The guarded prognosis for this very severe injury was discussed.  The recommendation was made to the patient for open reduction and internal fixation of this injury including the rationale why.  The diagnosis and expected prognosis, nature of the recommended procedure, the risks, benefits, and alternatives, the postoperative plan, and realistic expectations for short and long term outcome were all discussed with the patient in layman's terms.  No guarantees were expressed or implied as to outcome.  The expectation that there would likely always be some amount of long-term, even permanent, pain in the injured foot due to the severity of this particular injury, whether minor or major, was included as part of this discussion.  The patient had the opportunity to have all the questions at the time asked and answered appropriately, verbalized understanding of this discussion, and verbalized the wish to proceed with the planned procedure.  Written informed consent was obtained.     DESCRIPTION OF PROCEDURE:             The patient, James Retana, was met in the preoperative area where the correct surgical site was identified with patient  participation and marked by the primary surgeon.  All questions were answered.  The skin on the dorsal aspect of the operative foot was noted to be intact without visible open wounds, suspicious lesions, fracture blisters, or skin necrosis.  It did appear to passively wrinkle.  The regional anesthesia team performed left sciatic and saphenous nerve blocks.  The patient was then brought to the operating room and was safely transferred to the operating table in the supine position with all bony prominences well padded and a safety strap across the waist.  The anesthesia team successfully induced general anesthesia and placed a supraglottic airway.  A soft bump was placed underneath the hip of the operative lower extremity to keep the toes pointing upwards.  Cefazolin was administered IV per protocol.  Venous thromboembolic prophylaxis was performed with a sequential device on the nonoperative lower extremity.  A tourniquet was placed on the thigh of the operative lower extremity.  The operative lower extremity was then prepped and draped free in the usual sterile fashion, with gauze and Ioban covering the toes for the entire case.  Prior to the start of surgery, a multidisciplinary time out was performed per hospital protocol confirming among the other items, the correct patient identity, procedure, body part, and laterality.  All in the room verbalized agreement.     The operative lower extremity was exsanguinated with a Cleve bandage, and the tourniquet was inflated to 250 mmHg.  A dorsal longitudinal incision was made over the foot, centered between the first and second rays in the medial-lateral direction, and over the tarsometatarsal joints in the proximal-distal direction.  The incision was made through skin only with a #15 blade, and was carefully dissected through the soft tissues with tenotomy scissors.  Meticulous hemostasis was achieved.  The fascia over the EHL and EHB tendons was incised, and the EHL and  EHB tendons were gently and bluntly retracted medially, as there was already a traumatic appearing rent in the deep fascia lateral to the EHL and EHB tendons with exposed base of second metatarsal subluxated dorsal and lateral to the second TMT joint.  The underlying dorsal neurovascular bundle was carefully mobilized in the lateral direction by subperiosteally elevating the soft tissues deep to it, staying directly on bone, and exposing the dorsal aspect of the second metatarsal base and intermediate cuneiform all the way from medial to lateral across the entire widths, allowing safe and careful placement of a bent Hohmann retractor directly on bone on the lateral second metatarsal base under direct visualization, to gently and safely retract the neurovascular bundle laterally.  Care was taken to relax this retractor periodically throughout the case, as well as to protect the neurovascular bundle whenever work was being done in its vicinity.     The first and second tarsometatarsal joints were clearly visualized with both the first and second metatarsals displaced laterally from their respective cuneiforms.   Soft tissue debris was removed from the Lisfranc joint.  The first and second TMT joints were inspected; the articular surfaces of the first TMT joint appeared to be intact, and the second TMT joint was notable for chondral debris apparently arising from the base of the second metatarsal.  This was partially debrided to a stable base.  There were known fractures involving the plantar base of the second metatarsal, but the dorsal aspect of the base and shaft appeared intact and very amenable to screw fixation.  The second TMT joint was manually reduced from its lateral and dorsally subluxated position initially, with assistance of an elevator, confirmed to be anatomic by visual inspection and direct palpation, and provisionally fixated with a K-wire to prevent displacement of this joint while the Lisfranc joint  was reduced.  The Lisfranc joint as well as the first TMT joint and 1-2 intercuneiform joint all appeared unstable.  The Lisfranc joint was then reduced with a reduction clamp from the medial aspect of the medial cuneiform to the lateral aspect of the second metatarsal base, while also confirming that the 1-2 intercuneiform joint was reduced.  A small medial incision was carefully made followed by blunt spreading to bone with careful retraction and protection of the soft tissues prior to placement of the medial side of the clamp.  The lateral side of the clamp was placed under direct visualization directly on bone deep to the neurovascular bundle which was protected gently.  Direct visualization, palpation, and C-arm imaging confirmed an excellent reduction of the Lisfranc joint and the 1-2 intercuneiform joint without a gap.  A [place] elevator could not be inserted into the Lisfranc joint, 1-2 intercuneiform joint, or the second TMT joint, and they all appeared excellently reduced with direct visualization and palpation.  The Lisfranc joint was stabilized with a single fully threaded 4.0 mm cannulated screw, inserted in a lag by technique fashion with a 4.0 mm drill for the gliding hole proximally and 2.7 mm drill for the core diameter distally, from medial proximal to lateral distal achieving four-cortical purchase, and inserted through the previously made medial skin incision.  The guidewire for the 4.0 mm screw, clamp, and second TMT K-wire were removed, and the reduction was maintained at the Lisfranc, 1-2 intercuneiform, and second TMT joints.  The 1-2 intercuneiform joint was stabilized with a fully threaded 4.0 mm cannulated screw inserted in lag by technique fashion, through the previously made small medial incision proximal to the Lisfranc screw, and performed while carefully protecting the soft tissues.  The second TMT joint was stabilized with a fully threaded retrograde 4.0 mm screw in lag by technique  fashion through the dorsal incision while carefully protecting the soft tissues, and its reduction was excellently reduced, confirmed by visualization, palpation, and C-arm imaging, and a [place] elevator could not be inserted into the joint.  The first TMT joint was now manually reduced and stabilized with a retrograde percutaneous placed fully threaded 4.0 mm cannulated screw also inserted in a lag by technique fashion; the skin only was incised followed by blunt spreading to bone and gentle retraction and protection of the surrounding soft tissues with blunt retractors.  Reduction was confirmed by visualization, palpation, and C-arm imaging; a [place] elevator could not be inserted into the joint.  All of the guidepins and provisional pins were removed.     Attention was turned to the third TMT joint.  A separate more laterally placed dorsal incision was made over the dorsolateral midfoot between the third and fourth rays centered over the TMT joints with an adequate skin bridge, through skin only with a #15 blade.  Careful dissection was performed through the subcutaneous and deep tissues with tenotomy scissors.  Meticulous hemostasis was achieved.  Care was taken to avoid injury to the extensor tendons which were bluntly retracted.  The subjacent periosteum and joint capsule over the third and fourth TMT joints was incised longitudinally and careful subperiosteal dissection was performed with an elevator.  Both the third and fourth TMT joints were laterally and plantarly subluxated.  They were then reduced with the use of an elevator and direct pressure from lateral to medial, as well as pulling dorsally on them with a right angle clamp until visibly and palpably reduced at the TMT joints without gap or stepoff.  The third TMT joint was then stabilized with a retrograde fully threaded 4.0 mm cannulated screw inserted in lag by technique fashion through a separate incision.  For this screw placement, the skin only  was incised followed by blunt spreading to bone and gentle retraction and protection of the surrounding soft tissues with blunt retractors.  C-arm imaging, palpation, and direct visualization confirmed excellent reduction of the third TMT joint; at this point it was noted that a portion of the guide pin for the 4.0 mm screw for the third TMT joint appeared to still be present in the lateral cuneiform and likely irretrievable.  This was approximately an inch long by measurement of the removed portion of the guide pin and comparing it with an intact one. It did not appear to be in a position to cause any harm as it appeared to be completely embedded within bone on all views, and efforts to retrieve it would likely be unsuccessful and more likely to cause net harm than good.  The reduction of the fourth and fifth TMT joints had been partially completed with reduction of the previously reduced joints; these joint reductions were now fine tuned manually and with gentle pressure with blunt instruments.  Excellent reduction was confirmed by direct visualization, palpation, and C-arm imaging in multiple views.  Finally, the fourth and fifth TMT joints were stabilized with a single percutaneously inserted retrograde 0.062 inch K-wire each, entering the metatarsals and aiming into the cuboid.     Final C-arm images were taken in the AP, oblique, and lateral views of the operative foot to confirm alignment and implant placement were all excellent.  The tourniquet had been deflated earlier intraoperatively at 120 minutes.  There was no evidence for any vascular injury during this case.  Meticulous hemostasis was achieved.  There was an easily palpable dorsalis pedis pulse after deflation of the tourniquet.  The wounds were thoroughly irrigated and then closed in layers with 2-0 Vicryl for the subcutaneous tissues in the lateral incision and 3-0 nylon for the skin for all incisions.  The incisions were carefully cleansed with  sterile saline and dried.  Sterile dressings were then applied.  A well-padded splint was applied.  All surgical counts were reported as correct at the end of the case.  The patient was taken to the recovery room in stable condition.  I was present and scrubbed in for the entire case.  The minor complication of the broken and retained approximately inch-long portion of guide in irretrievably embedded in the lateral cuneiform was discussed with the patient postoperatively in the PACU, is not expected to cause harm, and will likely not require any treatment.        POSTOPERATIVE PLAN:    Discharge home from PACU once criteria met.  Diet:  Clear liquids and advance as tolerated.  Pain management:  Multimodal. Wean from IV to oral medications.  Weightbearing status: Strict nonweightbearing on the operative lower extremity for at least 6 weeks.  Use appropriate assistive gait devices and assistance (nursing/PT) for ambulation.  Activity: May be up ad eduardo for ADLs but encourage flat bedrest with the foot and ankle of the operative lower extremity elevated above the level of the heart and iced as much of the time as possible for the first 2 weeks postop.  Elevate heels off of the bed.   Xrays:  Completed intraoperatively.  Immobilization:  Keep splint clean, dry, and intact.  Splint will be removed at the first clinic follow-up appointment at 2 weeks postop.  Dressings: Dressings will be changed at the first clinic follow-up visit at 2 weeks postop.  DVT prophylaxis:   mg po daily until 6 weeks postoperative.  Follow up:  2 weeks postop in ortho clinic for dressing change, wound check, and possible suture removal if appropriate to do so at that time.  Splint will be converted to a medical boot which may be removed for brief periods tid for hygiene, skin checks, and active ROM exercises.  Continue nonweightbearing on operative LE until at least 6 weeks postop at which time weightbearing radiographs will be performed  of the operative foot.        All Saenz MD  Attending surgeon  Orthopaedic Surgery

## 2025-01-16 NOTE — TELEPHONE ENCOUNTER
Patient Returning Call    Reason for call:  patient wanting to touch base with RN or CT regarding xr imaging to be pushed to him via [employer] or email, he is not able to pull up himself, requesting callback   Email for patient: [email or web address]    Information relayed to patient:  te sent to clinic     Patient has additional questions:  No      Could we send this information to you in [place] or would you prefer to receive a phone call?:   Patient would prefer a phone call   Okay to leave a detailed message?: Yes at Cell number on file:    Telephone Information:   Mobile [phone]

## 2025-01-16 NOTE — ANESTHESIA POSTPROCEDURE EVALUATION
Patient: James Retana    Procedure: Procedure(s):  Open Reduction Internal Fixation of Left Foot Lisfranc Injury       Anesthesia Type:  General    Note:  Disposition: Outpatient   Postop Pain Control: Uneventful            Sign Out: Well controlled pain   PONV: No   Neuro/Psych: Uneventful            Sign Out: Acceptable/Baseline neuro status   Airway/Respiratory: Uneventful            Sign Out: Acceptable/Baseline resp. status   CV/Hemodynamics: Uneventful            Sign Out: Acceptable CV status; No obvious hypovolemia; No obvious fluid overload   Other NRE: NONE   DID A NON-ROUTINE EVENT OCCUR? No           Last vitals:  Vitals Value Taken Time   /69 01/15/25 1830   Temp 35.9  C (96.7  F) 01/15/25 1815   Pulse 87 01/15/25 1837   Resp 16 01/15/25 1837   SpO2 96 % 01/15/25 1836   Vitals shown include unfiled device data.    Electronically Signed By: Christiano Gaming DO  January 15, 2025  6:41 PM

## 2025-01-31 ENCOUNTER — OFFICE VISIT (OUTPATIENT)
Dept: ORTHOPEDICS | Facility: CLINIC | Age: 45
End: 2025-01-31
Payer: COMMERCIAL

## 2025-01-31 DIAGNOSIS — Z98.890 S/P FOOT SURGERY, LEFT: Primary | ICD-10-CM

## 2025-01-31 PROCEDURE — 99024 POSTOP FOLLOW-UP VISIT: CPT | Performed by: PHYSICIAN ASSISTANT

## 2025-01-31 NOTE — LETTER
1/31/2025      James Retana  [address] [address]      Dear Colleague,    Thank you for referring your patient, James Retana, to the [facility] [place]. Please see a copy of my visit note below.    Subjective  WHAT:  Left foot   WHEN: 01/15/2025  HOW:  Status post Open reduction, internal fixation of left midfoot (all five tarsometatarsal joints and Lisfranc joint)     James is two weeks status post ORIF of left mid foot TMT joints and Lisfranc joint.  Overall the patient notes they are doing well.  The patient reports pain is mild to moderate but improving.  James currently ambulates NWB in post-op splint with crutch assist.  James has been compliant with LLE elevation and maintaining a good healthy diet for healing.The patient is not yet attending physical therapy.  No complications reported since surgery.    Patient denies fevers, chills, worsening pain, incisional drainage or worrisome redness. Denies calf pain or tenderness, chest pain or shortness of breath.     Objective  Musculoskeletal:  Left Foot   Skin:Splint removed, skin intact, no rashes or lesions, surgical incisions benign and healing well, sutures removed without complication.  Effusion:present, mild  Swelling:present mild   Atrophy:absent  Deformity:none  Gait: NWB, Crutch assist    Range of motion:limited but WNL at this phase post-op Light touch sensation intact all dermatomes tested.  DP/PT pulses easily palpated, Foot is WWP  Leg demonstrates no edema, periarticular ankle swelling or erythema    Imaging   No images performed today    Assessment  Status post Open reduction, internal fixation of left midfoot (all five tarsometatarsal joints and Lisfranc joint)  completed on 1/15/2025, healing well at this phase post-op, sutures removed today.    Plan  James is doing well overall postoperatively and has a fantastic attitude which will go a long way to support his recovery.  A thorough discussion was  had today with the patient regarding the surgical procedure and continued precautions.  James was cleared to transition from postop splint to long cam boot today. CAM boot currently at home. Extra cushion for percutaneous pins provided.  The patient may remove boot/ACE/Dressings 15-20 minutes 3x/day for exercises, hygiene and skin checks The patient is not yet ready to start physical therapy, but was instructed to start a home exercise program (ABC's, ankle pumps, circles clockwise/counterclockwise and dorsiflexion stretching).  HEP was demonstrated in clinic today. Incision care reviewed, no soaking, showers only. The patient will remain nonweightbearing for an additional 4 weeks until follow-up with Dr. Saenz.  James was advised to take aspirin for DVT prophylaxis for the full 6 weeks prescribed.  Multivitamin and nutritional optimization discussed. James will follow-up in 4 weeks for re-assessment and x-rays.  All other questions were answered today.       Again, thank you for allowing me to participate in the care of your patient.        Sincerely,        Polly Power PA-C    Electronically signed

## 2025-01-31 NOTE — NURSING NOTE
Reason For Visit:   Chief Complaint   Patient presents with    Surgical Followup     2 weeks s/p Left lisfranc DOS 1/15/25       There were no vitals taken for this visit.         Amanda Peterson, ATC

## 2025-02-11 NOTE — PROGRESS NOTES
Subjective  WHAT:  Left foot   WHEN: 01/15/2025  HOW:  Status post Open reduction, internal fixation of left midfoot (all five tarsometatarsal joints and Lisfranc joint)     James is two weeks status post ORIF of left mid foot TMT joints and Lisfranc joint.  Overall the patient notes they are doing well.  The patient reports pain is mild to moderate but improving.  James currently ambulates NWB in post-op splint with crutch assist.  James has been compliant with LLE elevation and maintaining a good healthy diet for healing.The patient is not yet attending physical therapy.  No complications reported since surgery.    Patient denies fevers, chills, worsening pain, incisional drainage or worrisome redness. Denies calf pain or tenderness, chest pain or shortness of breath.     Objective  Musculoskeletal:  Left Foot   Skin:Splint removed, skin intact, no rashes or lesions, surgical incisions benign and healing well, sutures removed without complication.  Effusion:present, mild  Swelling:present mild   Atrophy:absent  Deformity:none  Gait: NWB, Crutch assist    Range of motion:limited but WNL at this phase post-op Light touch sensation intact all dermatomes tested.  DP/PT pulses easily palpated, Foot is WWP  Leg demonstrates no edema, periarticular ankle swelling or erythema    Imaging   No images performed today    Assessment  Status post Open reduction, internal fixation of left midfoot (all five tarsometatarsal joints and Lisfranc joint)  completed on 1/15/2025, healing well at this phase post-op, sutures removed today.    Plan  James is doing well overall postoperatively and has a fantastic attitude which will go a long way to support his recovery.  A thorough discussion was had today with the patient regarding the surgical procedure and continued precautions.  James was cleared to transition from postop splint to long cam boot today. CAM boot currently at home. Extra cushion for percutaneous pins provided.  The patient  may remove boot/ACE/Dressings 15-20 minutes 3x/day for exercises, hygiene and skin checks The patient is not yet ready to start physical therapy, but was instructed to start a home exercise program (ABC's, ankle pumps, circles clockwise/counterclockwise and dorsiflexion stretching).  HEP was demonstrated in clinic today. Incision care reviewed, no soaking, showers only. The patient will remain nonweightbearing for an additional 4 weeks until follow-up with Dr. Saenz.  James was advised to take aspirin for DVT prophylaxis for the full 6 weeks prescribed.  Multivitamin and nutritional optimization discussed. James will follow-up in 4 weeks for re-assessment and x-rays.  All other questions were answered today.

## 2025-02-20 DIAGNOSIS — Z98.890 S/P FOOT SURGERY, LEFT: Primary | ICD-10-CM

## 2025-02-28 ENCOUNTER — ANCILLARY PROCEDURE (OUTPATIENT)
Dept: GENERAL RADIOLOGY | Facility: CLINIC | Age: 45
End: 2025-02-28
Attending: ORTHOPAEDIC SURGERY
Payer: COMMERCIAL

## 2025-02-28 ENCOUNTER — OFFICE VISIT (OUTPATIENT)
Dept: ORTHOPEDICS | Facility: CLINIC | Age: 45
End: 2025-02-28
Payer: COMMERCIAL

## 2025-02-28 DIAGNOSIS — Z98.890 S/P FOOT SURGERY, LEFT: ICD-10-CM

## 2025-02-28 DIAGNOSIS — S93.325D LISFRANC DISLOCATION, LEFT, SUBSEQUENT ENCOUNTER: Primary | ICD-10-CM

## 2025-02-28 PROCEDURE — 99024 POSTOP FOLLOW-UP VISIT: CPT | Performed by: ORTHOPAEDIC SURGERY

## 2025-02-28 PROCEDURE — 73630 X-RAY EXAM OF FOOT: CPT | Mod: LT | Performed by: RADIOLOGY

## 2025-02-28 NOTE — NURSING NOTE
Reason For Visit:   Chief Complaint   Patient presents with    RECHECK     Patient returns 6w 2d s/p ORIF left Lisfranc Fx.  He reports doing well today.  He is anxious to bear weight.  He presents to clinic NWB in tall cam boot with crutches for mobility.       There were no vitals taken for this visit.    Pain Assessment  Patient Currently in Pain: No    Livan Saini, ATC     Urine drug screen  Discussed with patient that I cannot refill this medication if using on a sparingly basis.  If she is needing medication refilled monthly, will refer to pain management

## 2025-02-28 NOTE — LETTER
2/28/2025      James Retana  [address] [address]      Dear Colleague,    Thank you for referring your patient, James Retana, to the [facility] [place]. Please see a copy of my visit note below.    Orthopaedic Surgery Clinic Follow-up Appointment    DOI:  01/09/2025, closed L homolateral Lisfranc fx-dislocation (CT with medial & lateral cuneiform, cuboid, and 2nd met base fractures).  DOS: 01/15/2025, ORIF L midfoot.    History:  I saw this very pleasant 44-year-old gentleman today in follow-up about 6 weeks after ORIF of a closed left homolateral Lisfranc fracture dislocation.  He is now 6 weeks 2 days postop.  He reports that he is doing well and denies any pain at this time.  He reports he is keen on starting to bear weight on the foot.  He presents to clinic today nonweightbearing as instructed in a tall cam walker with crutches for mobility.   He denies any recent fevers, chills, wound drainage, or intercurrent trauma.  He is a very healthy and active gentleman and states that one of his goals would be returning to snowboarding next winter.    Exam:  Examination today shows the surgical incisions to be well-healed and dry throughout the left foot.  The K-wires appear to be in place and stable, with clean, dry, and intact pin sites with no apparent exudates or erythema.  I see no overt signs for infection, drainage, or dehiscence.  Light touch sensation is endorsed as diminished, as expected, in the first webspace, and present and intact appearing in the other dermatomes of the left foot.  3/4 easily palpable left DP and PT pulses.  The patient demonstrates 5/5 motor strength left tib ant, EHL, EDL, gastrosoleus, FHL, FDL, PTT, and peroneals.  Toe range of motion is somewhat stiff as expected.  The fracture & dislocation sites appear to be all nontender to palpation.    Imaging:  Independent review of imaging was performed including weightbearing AP, oblique,  and lateral left foot radiographs dated today, 2/28/2025.  Images were discussed with and shown to the patient, and compared with previous images.  Significant improvement in the alignment of all 5 TMT joints and the Lisfranc joint compared with the injury studies.  Hardware appears to be intact, and stable compared with the intraoperative imaging.  As noted previously, there is a broken portion of a guidewire present in the lateral cuneiform embedded within bone.  I discussed this with the patient.  It does not appear to be in a position to irritate soft tissues or cause any significant problems.  The retrograde first TMT joint screw does appear to be slightly bent.  Alignment of the midfoot appears to be well-maintained without any evidence for loss of reduction.    Impression:  Very pleasant 44-year-old gentleman doing well status post ORIF of a closed homolateral left Lisfranc fracture dislocation.    Plan:  Findings and plan were discussed with Mr. Retana.  The temporary K-wires were removed today as planned without incident after a sterile Betadine prep, and followed by monitoring the sites for any signs of bleeding.  He tolerated this well without any adverse events apparent.  A sterile dressing was applied.  He may now start to weight-bear as tolerated on the left lower extremity with the boot on at all times while weightbearing, and letting pain be his guide, listening to his body, and not trying to push through the pain.  He may have the boot off when nonweightbearing.  He should continue to do his range of motion exercises, as he has been, including circles in both directions, four-quadrant range of motion, tracing the alphabet, and also toe stretching in both dorsiflexion and plantarflexion for all 5 toes.  I discussed the toe extensor tendons appear to be getting caught up in scar tissue on the dorsum of the foot, and that the diminished ROM does not appear to be due to lack of function of the muscles  themselves.  I discussed the option for hardware removal in the future, ideally not sooner than about 6 months postoperatively.  There are arguments both for and against hardware removal which I discussed.  The risks, benefits, and alternatives to this potential surgery were also discussed.  The patient verbalized an interest in potentially proceeding with left foot hardware removal in the future.  Follow-up in 6 weeks with repeat weightbearing left foot radiographs, or sooner for any problems, questions, or concerns.  Signs and symptoms watch out for that should prompt sooner medical attention were discussed.  PT discussed at politely offered.      Again, thank you for allowing me to participate in the care of your patient.        Sincerely,        All Saenz MD    Electronically signed

## 2025-02-28 NOTE — PROGRESS NOTES
Orthopaedic Surgery Clinic Follow-up Appointment    DOI:  01/09/2025, closed L homolateral Lisfranc fx-dislocation (CT with medial & lateral cuneiform, cuboid, and 2nd met base fractures).  DOS: 01/15/2025, ORIF L midfoot.    History:  I saw this very pleasant 44-year-old gentleman today in follow-up about 6 weeks after ORIF of a closed left homolateral Lisfranc fracture dislocation.  He is now 6 weeks 2 days postop.  He reports that he is doing well and denies any pain at this time.  He reports he is keen on starting to bear weight on the foot.  He presents to clinic today nonweightbearing as instructed in a tall cam walker with crutches for mobility.   He denies any recent fevers, chills, wound drainage, or intercurrent trauma.  He is a very healthy and active gentleman and states that one of his goals would be returning to snowboarding next winter.    Exam:  Examination today shows the surgical incisions to be well-healed and dry throughout the left foot.  The K-wires appear to be in place and stable, with clean, dry, and intact pin sites with no apparent exudates or erythema.  I see no overt signs for infection, drainage, or dehiscence.  Light touch sensation is endorsed as diminished, as expected, in the first webspace, and present and intact appearing in the other dermatomes of the left foot.  3/4 easily palpable left DP and PT pulses.  The patient demonstrates 5/5 motor strength left tib ant, EHL, EDL, gastrosoleus, FHL, FDL, PTT, and peroneals.  Toe range of motion is somewhat stiff as expected.  The fracture & dislocation sites appear to be all nontender to palpation.    Imaging:  Independent review of imaging was performed including weightbearing AP, oblique, and lateral left foot radiographs dated today, 2/28/2025.  Images were discussed with and shown to the patient, and compared with previous images.  Significant improvement in the alignment of all 5 TMT joints and the Lisfranc joint compared with the  injury studies.  Hardware appears to be intact, and stable compared with the intraoperative imaging.  As noted previously, there is a broken portion of a guidewire present in the lateral cuneiform embedded within bone.  I discussed this with the patient.  It does not appear to be in a position to irritate soft tissues or cause any significant problems.  The retrograde first TMT joint screw does appear to be slightly bent.  Alignment of the midfoot appears to be well-maintained without any evidence for loss of reduction.    Impression:  Very pleasant 44-year-old gentleman doing well status post ORIF of a closed homolateral left Lisfranc fracture dislocation.    Plan:  Findings and plan were discussed with Mr. Retana.  The temporary K-wires were removed today as planned without incident after a sterile Betadine prep, and followed by monitoring the sites for any signs of bleeding.  He tolerated this well without any adverse events apparent.  A sterile dressing was applied.  He may now start to weight-bear as tolerated on the left lower extremity with the boot on at all times while weightbearing, and letting pain be his guide, listening to his body, and not trying to push through the pain.  He may have the boot off when nonweightbearing.  He should continue to do his range of motion exercises, as he has been, including circles in both directions, four-quadrant range of motion, tracing the alphabet, and also toe stretching in both dorsiflexion and plantarflexion for all 5 toes.  I discussed the toe extensor tendons appear to be getting caught up in scar tissue on the dorsum of the foot, and that the diminished ROM does not appear to be due to lack of function of the muscles themselves.  I discussed the option for hardware removal in the future, ideally not sooner than about 6 months postoperatively.  There are arguments both for and against hardware removal which I discussed.  The risks, benefits, and alternatives to  this potential surgery were also discussed.  The patient verbalized an interest in potentially proceeding with left foot hardware removal in the future.  Follow-up in 6 weeks with repeat weightbearing left foot radiographs, or sooner for any problems, questions, or concerns.  Signs and symptoms watch out for that should prompt sooner medical attention were discussed.  PT discussed at politely offered.

## 2025-04-10 DIAGNOSIS — Z98.890 S/P FOOT SURGERY, LEFT: Primary | ICD-10-CM

## 2025-04-11 ENCOUNTER — ANCILLARY PROCEDURE (OUTPATIENT)
Dept: GENERAL RADIOLOGY | Facility: CLINIC | Age: 45
End: 2025-04-11
Attending: PHYSICIAN ASSISTANT
Payer: COMMERCIAL

## 2025-04-11 ENCOUNTER — OFFICE VISIT (OUTPATIENT)
Dept: ORTHOPEDICS | Facility: CLINIC | Age: 45
End: 2025-04-11
Payer: COMMERCIAL

## 2025-04-11 DIAGNOSIS — R26.9 ALTERED GAIT: ICD-10-CM

## 2025-04-11 DIAGNOSIS — S93.325D LISFRANC DISLOCATION, LEFT, SUBSEQUENT ENCOUNTER: ICD-10-CM

## 2025-04-11 DIAGNOSIS — Z98.890 S/P FOOT SURGERY, LEFT: ICD-10-CM

## 2025-04-11 DIAGNOSIS — Z98.890 S/P FOOT SURGERY, LEFT: Primary | ICD-10-CM

## 2025-04-11 PROCEDURE — 73630 X-RAY EXAM OF FOOT: CPT | Mod: LT | Performed by: RADIOLOGY

## 2025-04-11 PROCEDURE — 99024 POSTOP FOLLOW-UP VISIT: CPT | Performed by: PHYSICIAN ASSISTANT

## 2025-04-11 RX ORDER — IBUPROFEN 600 MG/1
600 TABLET, FILM COATED ORAL EVERY 8 HOURS PRN
Qty: 50 TABLET | Refills: 1 | Status: SHIPPED | OUTPATIENT
Start: 2025-04-11 | End: 2025-05-14

## 2025-04-11 NOTE — NURSING NOTE
Reason For Visit:   Chief Complaint   Patient presents with    RECHECK     12 week post op       Pain Assessment  Patient Currently in Pain: Antonia Brenner

## 2025-04-11 NOTE — LETTER
4/11/2025      James Retana  [address] [address]      Dear Colleague,    Thank you for referring your patient, James Retana, to the [facility] [place]. Please see a copy of my visit note below.    Subjective  WHAT:             Left foot   WHEN:            01/15/2025  HOW:              Status post Open reduction, internal fixation of left midfoot (all five tarsometatarsal joints and Lisfranc joint)      James is 12 weeks status post ORIF of left mid foot TMT joints and Lisfranc joint.  Overall the patient notes they are doing well.  The patient reports minimal pain. James currently ambulates FWB in normal footwear/running shoes without assistive device.  James was given clearance to return to WB activity at his previous appointment with Dr. Saenz 6 weeks ago.  Devin reports return to strengthening routine squatting up to 105 lbs, increasing at 2 lb increments without difficulty.  James has been avoiding high impact and agility activities to reduce strain to foot hardware.  Devin reports his gait is abnormal,  difficulty with spring off front of foot. James notes discomfort in his heel with first steps in the morning, and this improves throughout the day.    The patient is not attending physical therapy but feels he is progressing will with independently led HEP.  No complications reported since previous exam. James is interested in getting hardware out at 6 months post-op.     Patient denies fevers, chills, worsening pain, incisional drainage or worrisome redness. Denies calf pain or tenderness, chest pain or shortness of breath.      Objective  Musculoskeletal:  Left Foot   Skin:skin intact, no rashes or lesions, surgical incisions benign and healed  Effusion:none  Swelling:minimal  Atrophy:absent  Deformity:none  Gait: decreased midfoot push off     Range of motion:limited but WNL at this phase post-op  sensation intact all dermatomes tested, slightly diminished along  deep peroneal nerve distribution.  DP/PT pulses easily palpated, Foot is WWP  Leg demonstrates no edema, periarticular ankle swelling or erythema     Imaging   3 view x-rays of the left foot completed today at [facility] are reviewed and demonstrate:    No acute osseous abnormality.       Lisfranc joint internal fixation with multiple screws. Interval  removal of fixation pins. Slight curved contour of the screw  traversing the first metatarsal-medial cuneiform, similar to prior.  Disuse osteopenia.     Lisfranc articulation alignment is congruent on this weight bearing  study.     Mild degenerative changes of first metatarsophalangeal joint.      Soft tissue is unremarkable.                                                               Impression:     Stable Lisfranc fusion instrumentation.    I am in agreement with the radiologist report.     Assessment  Status post Open reduction, internal fixation of left midfoot (all five tarsometatarsal joints and Lisfranc joint)  completed on 1/15/2025, healing well     Plan  -James continues to do well overall postoperatively and has maintained a positive attitude through his recovery.   -Order for custom orthotics placed  -Similar/unchanged bending of first metatarsal medial cuneiform screw noted at previous exam.  -Continue with activities using pain as your guide. Recommend slowing strengthening program to avoid hardware complication.  -Risks of hardware complication with increased activity discussed.  James voiced an understanding of theses risks today.  -Rx for ibuprofen to address heel pain sent to pharmacy. NSAID precautions discussed.  -James is not interested in Physical Therapy at this time.   -James will follow-up in 8 weeks for re-assessment, repeat x-rays and to discuss hardware removal surgery with Dr. Saenz.  All other questions were answered today.     Again, thank you for allowing me to participate in the care of your patient.         Sincerely,        Polly Power PA-C    Electronically signed

## 2025-04-15 NOTE — PROGRESS NOTES
Subjective  WHAT:             Left foot   WHEN:            01/15/2025  HOW:              Status post Open reduction, internal fixation of left midfoot (all five tarsometatarsal joints and Lisfranc joint)      James is 12 weeks status post ORIF of left mid foot TMT joints and Lisfranc joint.  Overall the patient notes they are doing well.  The patient reports minimal pain. James currently ambulates FWB in normal footwear/running shoes without assistive device.  James was given clearance to return to WB activity at his previous appointment with Dr. Saenz 6 weeks ago.  Dvein reports return to strengthening routine squatting up to 105 lbs, increasing at 2 lb increments without difficulty.  James has been avoiding high impact and agility activities to reduce strain to foot hardware.  Devin reports his gait is abnormal,  difficulty with spring off front of foot. James notes discomfort in his heel with first steps in the morning, and this improves throughout the day.    The patient is not attending physical therapy but feels he is progressing will with independently led HEP.  No complications reported since previous exam. James is interested in getting hardware out at 6 months post-op.     Patient denies fevers, chills, worsening pain, incisional drainage or worrisome redness. Denies calf pain or tenderness, chest pain or shortness of breath.      Objective  Musculoskeletal:  Left Foot   Skin:skin intact, no rashes or lesions, surgical incisions benign and healed  Effusion:none  Swelling:minimal  Atrophy:absent  Deformity:none  Gait: decreased midfoot push off     Range of motion:limited but WNL at this phase post-op  sensation intact all dermatomes tested, slightly diminished along deep peroneal nerve distribution.  DP/PT pulses easily palpated, Foot is WWP  Leg demonstrates no edema, periarticular ankle swelling or erythema     Imaging   3 view x-rays of the left foot completed today at [facility] are reviewed and  demonstrate:    No acute osseous abnormality.       Lisfranc joint internal fixation with multiple screws. Interval  removal of fixation pins. Slight curved contour of the screw  traversing the first metatarsal-medial cuneiform, similar to prior.  Disuse osteopenia.     Lisfranc articulation alignment is congruent on this weight bearing  study.     Mild degenerative changes of first metatarsophalangeal joint.      Soft tissue is unremarkable.                                                               Impression:     Stable Lisfranc fusion instrumentation.    I am in agreement with the radiologist report.     Assessment  Status post Open reduction, internal fixation of left midfoot (all five tarsometatarsal joints and Lisfranc joint)  completed on 1/15/2025, healing well     Plan  -James continues to do well overall postoperatively and has maintained a positive attitude through his recovery.   -Order for custom orthotics placed  -Similar/unchanged bending of first metatarsal medial cuneiform screw noted at previous exam.  -Continue with activities using pain as your guide. Recommend slowing strengthening program to avoid hardware complication.  -Risks of hardware complication with increased activity discussed.  James voiced an understanding of theses risks today.  -Rx for ibuprofen to address heel pain sent to pharmacy. NSAID precautions discussed.  -James is not interested in Physical Therapy at this time.   -James will follow-up in 8 weeks for re-assessment, repeat x-rays and to discuss hardware removal surgery with Dr. Saenz.  All other questions were answered today.

## 2025-05-01 ENCOUNTER — TELEPHONE (OUTPATIENT)
Dept: ORTHOPEDICS | Facility: CLINIC | Age: 45
End: 2025-05-01
Payer: COMMERCIAL

## 2025-05-01 NOTE — TELEPHONE ENCOUNTER
Left Voicemail (1st Attempt) and Sent Mychart (1st Attempt) for the patient to call back and schedule the following:    Appointment type: Return Foot/Ankle  Provider:   Return date: r/s 6/6 to next available  Specialty phone number: [phone]

## 2025-05-05 ENCOUNTER — TELEPHONE (OUTPATIENT)
Dept: ORTHOPEDICS | Facility: CLINIC | Age: 45
End: 2025-05-05
Payer: COMMERCIAL

## 2025-05-05 NOTE — TELEPHONE ENCOUNTER
Left Voicemail (2nd Attempt) and Sent Mychart (2nd Attempt) for the patient to call back and schedule the following:    Appointment type: Return Foot/Ankle  Provider:   Return date: r/s 6/6 to next available  Specialty phone number: [phone]  MAX attempts made to reschedule. Writer will cancel 6/6 appt

## 2025-05-07 ENCOUNTER — TELEPHONE (OUTPATIENT)
Dept: URGENT CARE | Facility: URGENT CARE | Age: 45
End: 2025-05-07
Payer: COMMERCIAL

## 2025-05-07 NOTE — TELEPHONE ENCOUNTER
Pharmacy requesting refill of naproxen 500mg    Rx'd at  visit 1/9/2025    Patient is NOT established at [facility]    Fax sent back to pharmacy - obtain from PCP or patient to return to     RT Prabhjot (R)

## 2025-05-14 DIAGNOSIS — Z98.890 S/P FOOT SURGERY, LEFT: Primary | ICD-10-CM

## 2025-05-16 ENCOUNTER — ANCILLARY PROCEDURE (OUTPATIENT)
Dept: GENERAL RADIOLOGY | Facility: CLINIC | Age: 45
End: 2025-05-16
Attending: ORTHOPAEDIC SURGERY
Payer: COMMERCIAL

## 2025-05-16 ENCOUNTER — OFFICE VISIT (OUTPATIENT)
Dept: ORTHOPEDICS | Facility: CLINIC | Age: 45
End: 2025-05-16
Payer: COMMERCIAL

## 2025-05-16 DIAGNOSIS — Z96.9 RETAINED ORTHOPEDIC HARDWARE: Primary | ICD-10-CM

## 2025-05-16 DIAGNOSIS — S93.325D LISFRANC DISLOCATION, LEFT, SUBSEQUENT ENCOUNTER: ICD-10-CM

## 2025-05-16 DIAGNOSIS — Z98.890 S/P FOOT SURGERY, LEFT: ICD-10-CM

## 2025-05-16 PROCEDURE — 73630 X-RAY EXAM OF FOOT: CPT | Mod: LT | Performed by: RADIOLOGY

## 2025-05-16 PROCEDURE — 99213 OFFICE O/P EST LOW 20 MIN: CPT | Performed by: ORTHOPAEDIC SURGERY

## 2025-05-16 NOTE — NURSING NOTE
Reason For Visit:   Chief Complaint   Patient presents with    RECHECK     Patient returns 4m s/p ORIF left midfoot Lisfranc Fx.  Patient reports the left foot is doing well.  He denies having any unusual complaints, issues, concerns.  He would like to discuss eventual HWR today.       There were no vitals taken for this visit.    Pain Assessment  Patient Currently in Pain: Yes (1/10)  Primary Pain Location: Foot    Livan Saini, ATC

## 2025-05-16 NOTE — PROGRESS NOTES
Orthopaedic Surgery Clinic Follow-up Appointment    DOI:  01/09/2025, closed L homolateral Lisfranc fx-dislocation (CT with medial & lateral cuneiform, cuboid, and 2nd met base fractures).  DOS: 01/15/2025, ORIF L midfoot.    History:  I saw this most pleasant 44-year-old  today in follow-up for his left foot injury.  He is now about 4 months postop and reports things are going well.  He rates his pain as about 1 out of 10 in severity.  He denies any complaints, issues, or concerns at this time.  He expresses he would like to discuss eventual hardware removal today.  He has been resuming many of his previous activities including weightlifting and is squatting with progressively increasing weights using a squat bar.  He is trying to do this with the weight on the heel rather than the forefoot.  He has not been running or jumping yet.  He does note some pain in the midfoot with the what appears to be the second rocker of gait cycle.  He does have custom orthoses that have helped with this pain.  He is planning an upcoming trip to [facility] this mid to late August.  He was recently in [place] and reports this went well and that his foot felt normal.  In fact one day he was even able to walk 15 miles in a day.  History is obtained from interview with the patient.    Exam:  Examination shows the patient to ambulate independently without assistive gait devices, and with a normal appearing gait without antalgia.  There is a prominent but not tender screw head at the first metatarsal that he identifies as a spot they will have pain when he has pain.  The remainder of his left midfoot including Lisfranc joint, 1st through 3rd TMT joints, and remaining hardware appears to be nontender to palpation.    Imaging:  Independent review of imaging was performed including weightbearing AP, oblique, and lateral left foot radiographs dated today, 5/16/2025.  Comparison is made with multiple sets of previous  "radiographs, both preoperatively, intraoperatively, and postoperatively.  Images were discussed with and shown to the patient.  Stable appearing alignment of left foot Lisfranc joint and 1st through 5th TMT joints.  The broken guidepin from a cannulated screw is still present and embedded within the third or lateral cuneiform, stable appearing.  Other hardware appears to be intact and stable appearing without evidence for hardware failure.  There does appear to be some stable bending of the first TMT screw.  No obvious signs for infection or acute fractures.    Impression:  Very pleasant 44-year-old active, healthy patient doing well approximately 4 months postoperatively after ORIF of a closed left foot homolateral Lisfranc fracture dislocation which appears to be healing well.    Plan:  The findings and prognosis, as well as the nonoperative and operative treatment options were discussed.  The patient expressed an interest in hardware removal from his left foot.  I discussed the relative pros and cons of hardware retention versus hardware removal.  The potential for rediastasis of his Lisfranc joint or other joints by taking the hardware out too early was discussed.  The potential for increasing risk for fracture or 1 or more of the screws by taking the hardware out later was also discussed.  I discussed that hardware failure in his setting with a well-healing injury, is usually from repetitive motion at the joints the hardware crosses, rather than from nonunion of the injury, and as such is generally not particularly symptomatic.  However, if he did want hardware out after any hardware did break it would be much more difficult or impossible to remove.  I discussed that a \"sweet spot\" for compromising between the risks of rediastasis and hardware failure, would be removing the hardware sometime around or just after 6 months.  He reports he is going on a trip to [facility] in mid-August, which, if he had hardware " removal at 6 months post-ORIF, would place that trip in the middle of his 6-week postoperative period when he would have relatively limited mobility.  Mr. Retana accordingly expressed he would like to have this surgery done sometime after Labor Day.  The nature of the surgery, the risks, benefits, and alternatives, and the postoperative plan were all discussed in layman's terms.  We discussed nonweightbearing and elevation for 2 weeks afterwards, followed by weightbearing in a boot or postop shoe but with limited activities until 6 weeks postoperative, followed by gradual phasing in of activities.  We also discussed the risk of future development of posttraumatic arthritis and the possible need for additional surgery to treat that.  All questions this very pleasant gentleman had at this time were answered, and he verbalized understanding of and agreement with the treatment plan.    Disclaimer:  This note consists of symbols derived from keyboarding, dictation, and/or voice recognition software. As a result, although I do diligently check for accuracy, there may be errors in the script that have gone undetected. Please consider this when interpreting information found in this chart.

## 2025-05-16 NOTE — LETTER
5/16/2025      James Retana  [address] [address]      Dear Colleague,    Thank you for referring your patient, James Retana, to the [facility] [place]. Please see a copy of my visit note below.    Orthopaedic Surgery Clinic Follow-up Appointment    DOI:  01/09/2025, closed L homolateral Lisfranc fx-dislocation (CT with medial & lateral cuneiform, cuboid, and 2nd met base fractures).  DOS: 01/15/2025, ORIF L midfoot.    History:  I saw this most pleasant 44-year-old  today in follow-up for his left foot injury.  He is now about 4 months postop and reports things are going well.  He rates his pain as about 1 out of 10 in severity.  He denies any complaints, issues, or concerns at this time.  He expresses he would like to discuss eventual hardware removal today.  He has been resuming many of his previous activities including weightlifting and is squatting with progressively increasing weights using a squat bar.  He is trying to do this with the weight on the heel rather than the forefoot.  He has not been running or jumping yet.  He does note some pain in the midfoot with the what appears to be the second rocker of gait cycle.  He does have custom orthoses that have helped with this pain.  He is planning an upcoming trip to [facility] this mid to late August.  He was recently in [place] and reports this went well and that his foot felt normal.  In fact one day he was even able to walk 15 miles in a day.  History is obtained from interview with the patient.    Exam:  Examination shows the patient to ambulate independently without assistive gait devices, and with a normal appearing gait without antalgia.  There is a prominent but not tender screw head at the first metatarsal that he identifies as a spot they will have pain when he has pain.  The remainder of his left midfoot including Lisfranc joint, 1st through 3rd TMT joints, and remaining hardware  appears to be nontender to palpation.    Imaging:  Independent review of imaging was performed including weightbearing AP, oblique, and lateral left foot radiographs dated today, 5/16/2025.  Comparison is made with multiple sets of previous radiographs, both preoperatively, intraoperatively, and postoperatively.  Images were discussed with and shown to the patient.  Stable appearing alignment of left foot Lisfranc joint and 1st through 5th TMT joints.  The broken guidepin from a cannulated screw is still present and embedded within the third or lateral cuneiform, stable appearing.  Other hardware appears to be intact and stable appearing without evidence for hardware failure.  There does appear to be some stable bending of the first TMT screw.  No obvious signs for infection or acute fractures.    Impression:  Very pleasant 44-year-old active, healthy patient doing well approximately 4 months postoperatively after ORIF of a closed left foot homolateral Lisfranc fracture dislocation which appears to be healing well.    Plan:  The findings and prognosis, as well as the nonoperative and operative treatment options were discussed.  The patient expressed an interest in hardware removal from his left foot.  I discussed the relative pros and cons of hardware retention versus hardware removal.  The potential for rediastasis of his Lisfranc joint or other joints by taking the hardware out too early was discussed.  The potential for increasing risk for fracture or 1 or more of the screws by taking the hardware out later was also discussed.  I discussed that hardware failure in his setting with a well-healing injury, is usually from repetitive motion at the joints the hardware crosses, rather than from nonunion of the injury, and as such is generally not particularly symptomatic.  However, if he did want hardware out after any hardware did break it would be much more difficult or impossible to remove.  I discussed that a  "\"sweet spot\" for compromising between the risks of rediastasis and hardware failure, would be removing the hardware sometime around or just after 6 months.  He reports he is going on a trip to [facility] in mid-August, which, if he had hardware removal at 6 months post-ORIF, would place that trip in the middle of his 6-week postoperative period when he would have relatively limited mobility.  Mr. Retana accordingly expressed he would like to have this surgery done sometime after Labor Day.  The nature of the surgery, the risks, benefits, and alternatives, and the postoperative plan were all discussed in layman's terms.  We discussed nonweightbearing and elevation for 2 weeks afterwards, followed by weightbearing in a boot or postop shoe but with limited activities until 6 weeks postoperative, followed by gradual phasing in of activities.  We also discussed the risk of future development of posttraumatic arthritis and the possible need for additional surgery to treat that.  All questions this very pleasant gentleman had at this time were answered, and he verbalized understanding of and agreement with the treatment plan.    Disclaimer:  This note consists of symbols derived from keyboarding, dictation, and/or voice recognition software. As a result, although I do diligently check for accuracy, there may be errors in the script that have gone undetected. Please consider this when interpreting information found in this chart.    Again, thank you for allowing me to participate in the care of your patient.        Sincerely,        All Saenz MD    Electronically signed"

## 2025-05-16 NOTE — NURSING NOTE
Teaching Flowsheet     Visit Type: In Clinic    Time Start: 10:27am  Time End: 10:42am  Total Time Spent: 15 min.    Surgeon: Dr. Saenz  Location of Surgery (known or anticipated): [facility]  or [facility]  Type of Anesthesia: Choice  Worker's Compensation Procedure: No    Pertinent Medical History: PAST MEDICAL HISTORY: No past medical history on file.    PAST SURGICAL HISTORY:   Past Surgical History:   Procedure Laterality Date    OPEN REDUCTION INTERNAL FIXATION FOOT Left 1/15/2025    Procedure: Open Reduction Internal Fixation of Left Foot Lisfranc Fracture;  Surgeon: All Saenz MD;  Location: [facility] OR       FAMILY HISTORY: No family history on file.    SOCIAL HISTORY:   Social History     Tobacco Use    Smoking status: Never    Smokeless tobacco: Never   Substance Use Topics    Alcohol use: Yes     Comment: Moderate       Were medical conditions reviewed and appropriate for location? No  BMI: Normal BMI    Relevant Diagnosis: painful orthopedic hardware  Teaching Topic: hardware removal    Reviewed same-day surgery instructions with patient (NPO, showering, stoplight tool, need for pre-op H&P within 30 days of procedure, and responsible adult /person to stay with patient for 24 hours post surgery)      Person(s) involved in teaching:   Patient  : No.   Verified Patient's Phone Number: YES: [phone]    Caregiver//  Name: Idris  Phone Number: [phone]   Relationship: friend  Consent to Communicate on file: No       Motivation Level:  Asks Questions: Yes  Eager to Learn: Yes  Cooperative: Yes  Receptive (willing/able to accept information): Yes  Any cultural factors/Latter day beliefs that may influence understanding or compliance? No     Patient demonstrates understanding of the following:  Reason for the appointment, diagnosis and treatment plan: Yes  Knowledge of proper use of medications and conditions for which they are ordered (with special attention to  potential side effects or drug interactions): Yes  Which situations necessitate calling provider and whom to contact: Yes     Teaching Concerns Addressed:   Proper use and care of medical equip, care aids, etc.: Yes  Nutritional needs and diet plan: Yes  Pain management techniques: Yes  Wound Care: Yes  How and/when to access community resources: Yes  Need for pre-op with in 30 days: YES, to be done with PAC.      Does patient have difficulty getting a ride to appointments (post-ops, PT/OT): No  Patient's plan after discharge: home with friend     Instructional Materials Used/Given:  two bottles of chlorhexidine soap and a surgery packet given to patient in clinic.   - Important Contact Info/Phone Numbers: emphasizing clinic number [phone] and after hours number [phone]  - Map/location of surgery and follow-up appointments  - Showering instructions  - Stoplight Tool     -Next step: schedule a surgery date and schedule pre-op with PAC.    Day Contreras RN

## 2025-05-20 ENCOUNTER — TELEPHONE (OUTPATIENT)
Dept: ORTHOPEDICS | Facility: CLINIC | Age: 45
End: 2025-05-20
Payer: COMMERCIAL

## 2025-05-20 NOTE — TELEPHONE ENCOUNTER
Phoned patient to schedule surgery with Dr Saenz. I left him my direct number to call back at his convenience. [phone]

## 2025-05-21 PROBLEM — S93.325D LISFRANC DISLOCATION, LEFT, SUBSEQUENT ENCOUNTER: Status: ACTIVE | Noted: 2025-05-16

## 2025-05-21 PROBLEM — Z96.9 RETAINED ORTHOPEDIC HARDWARE: Status: ACTIVE | Noted: 2025-05-16

## 2025-05-21 NOTE — TELEPHONE ENCOUNTER
Incoming patient call to schedule surgery with Dr Saenz    Spoke with: Patient     Reason for Surgical Date: 1st available after 8/28/25 per patient request    Date of Surgery: 9/15/25     Estimated Arrival time Discussed with Patient:  No    Location of surgery: [facility]     Pre-Op H&P: Scheduled with PAC, 9/2/25 at 7am, virtual    Imaging: No     Additional Appointments: No     Post-Op Appt Date w/ NP/PA:  Polly Power PA-C  10/3/25 at 3:30pm    Post-Op Appt Date w/ Surgeon  10/31/25 at 4:20pm     Discussed with patient PAC RN will provide arrival time and instructions for surgery at the time of the appointment: [[place] locations only]: Yes    Standard Surgery Packet Sent: Yes 05/16/25  via Received in clinic by RN       Additional Comments:     N/A    Kanwal Bergeron MA on 5/21/2025 at 12:05 PM

## 2025-05-29 NOTE — CONFIDENTIAL NOTE
FUTURE VISIT INFORMATION      SURGERY INFORMATION:  Date: 9.15.25   Location: [facility] OR   Surgeon:  All Saenz MD   Anesthesia Type:  choice of block   Procedure: Removal of hardware from left foot     RECORDS REQUESTED FROM:       Pertinent Medical History:  HI REMOVAL DEEP IMPLANT   Most recent EKG+ Tracing:  -EMG- 1.17.2001- [facility]

## 2025-09-02 ENCOUNTER — VIRTUAL VISIT (OUTPATIENT)
Dept: SURGERY | Facility: CLINIC | Age: 45
End: 2025-09-02
Payer: COMMERCIAL

## 2025-09-02 ENCOUNTER — PRE VISIT (OUTPATIENT)
Dept: SURGERY | Facility: CLINIC | Age: 45
End: 2025-09-02

## 2025-09-02 DIAGNOSIS — Z96.9 RETAINED ORTHOPEDIC HARDWARE: ICD-10-CM

## 2025-09-02 DIAGNOSIS — Z01.818 PREOP EXAMINATION: Primary | ICD-10-CM

## 2025-09-02 PROCEDURE — 98005 SYNCH AUDIO-VIDEO EST LOW 20: CPT | Performed by: NURSE PRACTITIONER

## 2025-09-02 RX ORDER — METAPROTERENOL SULFATE 10 MG
500 TABLET ORAL 2 TIMES DAILY
COMMUNITY
Start: 2025-09-02

## 2025-09-02 ASSESSMENT — ENCOUNTER SYMPTOMS: ORTHOPNEA: 0

## (undated) DEVICE — DRILL BIT TWIST STRK CAN 2.7MM W/AO COUPLING 702449

## (undated) DEVICE — ESU GROUND PAD ADULT W/CORD E7507

## (undated) DEVICE — DRAPE C-ARM W/STRAPS 42X72" 07-CA104

## (undated) DEVICE — GLOVE BIOGEL PI MICRO INDICATOR UNDERGLOVE SZ 8.0 48980

## (undated) DEVICE — IMM LEG ELEVATOR 79-90191

## (undated) DEVICE — SOL NACL 0.9% IRRIG 1000ML BOTTLE 2F7124

## (undated) DEVICE — SU ETHILON 3-0 PS-1 18" 1663H

## (undated) DEVICE — GOWN XLG DISP 9545

## (undated) DEVICE — CAST PADDING 4" UNSTERILE 9044

## (undated) DEVICE — Device

## (undated) DEVICE — BNDG ELASTIC 4" DBL LENGTH UNSTERILE 6611-14

## (undated) DEVICE — CAST PLASTER SPLINT 5X30" 7395

## (undated) DEVICE — LINEN ORTHO PACK 5446

## (undated) DEVICE — SUCTION MANIFOLD NEPTUNE 2 SYS 4 PORT 0702-020-000

## (undated) DEVICE — TOURNIQUET CUFF 30" REPRO BLUE 60-7070-105

## (undated) DEVICE — PACK LOWER EXTREMITY RIVERSIDE SOP32LEFSX

## (undated) DEVICE — DRSG GAUZE 4X8" NON21842

## (undated) DEVICE — SOL WATER IRRIG 1000ML BOTTLE 2F7114

## (undated) DEVICE — GLOVE BIOGEL PI SZ 8.0 40880

## (undated) DEVICE — DRAPE C-ARMOR 5 SIDED 5523

## (undated) DEVICE — PIN GUARD 0.062 GREEN C-062

## (undated) DEVICE — CAST PADDING 4" STERILE 9044S

## (undated) DEVICE — STRAP KNEE/BODY 31143004

## (undated) DEVICE — SU VICRYL 2-0 CP-2 27" UND J869H

## (undated) DEVICE — SU VICRYL+ 0 CT 36" DYED VCP358H

## (undated) RX ORDER — PROPOFOL 10 MG/ML
INJECTION, EMULSION INTRAVENOUS
Status: DISPENSED
Start: 2025-01-15

## (undated) RX ORDER — HYDROMORPHONE HYDROCHLORIDE 1 MG/ML
INJECTION, SOLUTION INTRAMUSCULAR; INTRAVENOUS; SUBCUTANEOUS
Status: DISPENSED
Start: 2025-01-15

## (undated) RX ORDER — FENTANYL CITRATE 50 UG/ML
INJECTION, SOLUTION INTRAMUSCULAR; INTRAVENOUS
Status: DISPENSED
Start: 2025-01-15

## (undated) RX ORDER — ONDANSETRON 2 MG/ML
INJECTION INTRAMUSCULAR; INTRAVENOUS
Status: DISPENSED
Start: 2025-01-15

## (undated) RX ORDER — EPHEDRINE SULFATE 50 MG/ML
INJECTION, SOLUTION INTRAMUSCULAR; INTRAVENOUS; SUBCUTANEOUS
Status: DISPENSED
Start: 2025-01-15

## (undated) RX ORDER — CEFAZOLIN SODIUM/WATER 2 G/20 ML
SYRINGE (ML) INTRAVENOUS
Status: DISPENSED
Start: 2025-01-15

## (undated) RX ORDER — DEXAMETHASONE SODIUM PHOSPHATE 4 MG/ML
INJECTION, SOLUTION INTRA-ARTICULAR; INTRALESIONAL; INTRAMUSCULAR; INTRAVENOUS; SOFT TISSUE
Status: DISPENSED
Start: 2025-01-15

## (undated) RX ORDER — KETOROLAC TROMETHAMINE 30 MG/ML
INJECTION, SOLUTION INTRAMUSCULAR; INTRAVENOUS
Status: DISPENSED
Start: 2025-01-15